# Patient Record
Sex: FEMALE | Employment: FULL TIME | URBAN - METROPOLITAN AREA
[De-identification: names, ages, dates, MRNs, and addresses within clinical notes are randomized per-mention and may not be internally consistent; named-entity substitution may affect disease eponyms.]

---

## 2022-09-08 ENCOUNTER — OFFICE VISIT (OUTPATIENT)
Dept: ORTHOPEDIC SURGERY | Age: 62
End: 2022-09-08
Payer: COMMERCIAL

## 2022-09-08 VITALS — HEIGHT: 69 IN | BODY MASS INDEX: 32.58 KG/M2 | WEIGHT: 220 LBS

## 2022-09-08 DIAGNOSIS — M17.12 PRIMARY OSTEOARTHRITIS OF LEFT KNEE: Primary | ICD-10-CM

## 2022-09-08 PROCEDURE — 99203 OFFICE O/P NEW LOW 30 MIN: CPT | Performed by: ORTHOPAEDIC SURGERY

## 2022-09-08 NOTE — PROGRESS NOTES
12 [facility]  History and Physical      Date:  2022    Name:  Silvia Jarvis  Address:  [address] [place] [address]    :  1960      Age:   57 y.o. Chief Complaint    Knee Pain (Left knee pain)      History of Present Illness:  Silvia Jarvis is a 57 y.o. female who presents for left knee pain. Patient states she has had left knee pain for well over 1 year. She recently had a arthroscopic meniscal root repair 2021. She has had continued pain since that time with minimal relief. She received a cortisone injection approximately 6 months ago with minimal relief and subsequently a gel injection 3 months ago again with minimal relief. She has a walking tolerance of approximately 5 minutes. She rates her pain at a 7 out of 10 which is sharp and aching in nature. She presents today for total knee replacement consultation. The patient denies any new injury. The patient denies any catching, giving way, joint locking, numbness, paresthesias, or weakness. Pain Assessment  Location of Pain: Knee  Location Modifiers: Left  Severity of Pain: 7  Duration of Pain: A few minutes  Frequency of Pain: Intermittent  Aggravating Factors: Stairs, Walking, Standing  Limiting Behavior: Some  Result of Injury: No  Work-Related Injury: No  Are there other pain locations you wish to document?: No    No past medical history on file. No past surgical history on file. No family history on file. Social History     Socioeconomic History    Marital status: Unknown       No current outpatient medications on file. No current facility-administered medications for this visit. No Known Allergies      Review of Systems:  A 14 point review of systems was completed by the patient and is available in the media section of the scanned medical record and was reviewed.         Vital Signs:   Ht 5' 9\" (1.753 m)   Wt 220 lb (99.8 kg)   LMP  (LMP Unknown) BMI 32.49 kg/m²     General Exam:    General: Cristi Brooke is a healthy and well appearing 57y.o. year old female who is sitting comfortably in our office in no acute distress. Neuro: Alert & Oriented x 3,  normal,  no focal deficits noted. Normal mood & affect. Eyes: Sclera clear  Ears: Normal external ear  Mouth: Patient wearing a mask  Pulm: Respirations unlabored and regular   Skin: Warm, well perfused      Knee Examination: Left knee    Inspection:  No effusion, ecchymosis, discoloration, erythema, excessive warmth. no gross deformities, no signs of infection. Palpation: There is moderate patellofemoral crepitus, there is lateral joint line tenderness. No other osseous or soft tissue tenderness around the knee. Negative calf tenderness    Range of Motion:  0-130 degrees without pain or difficulty. Slightly limited patellar mobility. Strength:  5/5 quadriceps, 5/5 hamstrings    Special Tests:  No ligament instability, valgus and varus stress test are stable at 0 and 30°. Lachman's exhibits a solid endpoint. Negative posterior drawer. Negative Homans sign    Gait: Non antalgic, without the use of assistive devices    Alignment: Slight varus    Neuro: Sensation equal bilateral lower extremities    Vascular: 2+ posterior tibialis pulse        Radiology:   Previously obtain imaging reviewed. No new images obtained. Office Procedures:  No orders of the defined types were placed in this encounter. Assessment: Patient is a 57 y.o. female left knee osteoarthritis with varus alignment    Encounter Diagnosis   Name Primary? Primary osteoarthritis of left knee Yes       No orders of the defined types were placed in this encounter. Medical decision making:  Patient's workup and evaluation were reviewed with the patient in the office today. Imaging was reviewed with the patient. Patient's diagnosis of moderate to severe left knee osteoarthritis was discussed at length.

## 2022-09-08 NOTE — LETTER
[address]  [address] [facility] [address]  Phone: [phone]  Fax: [phone]    Atul Nye MD        September 8, 2022     Patient: Tmamy Berry   YOB: 1960   Date of Visit: 9/8/2022       To Whom It May Concern: It is my medical opinion that Melissa Doe may return to work on 09/09/22 working 40 hours per week, 8 hour days with 2 days rest per week. Surgery scheduled for 10/10/22. If you have any questions or concerns, please don't hesitate to call.     Sincerely,      Board Certified Orthopaedic Surgeon  President and [address] [employer]    Professor Andre of [address] [place] [address]

## 2022-09-12 ENCOUNTER — TELEPHONE (OUTPATIENT)
Dept: ORTHOPEDIC SURGERY | Age: 62
End: 2022-09-12

## 2022-09-12 NOTE — TELEPHONE ENCOUNTER
Called and LVM that MRI had been approved and order was faxed to [facility]. She can call at her earliest convenience and schedule.

## 2022-09-13 ENCOUNTER — TELEPHONE (OUTPATIENT)
Dept: ORTHOPEDIC SURGERY | Age: 62
End: 2022-09-13

## 2022-09-13 NOTE — TELEPHONE ENCOUNTER
General Question     Subject: [facility]  Patient and /or Facility Request: Octavio Sepulveda  Contact Number: [phone]    PATIENT CALLED IN TO LET THE OFFICE KNOW THAT [facility] DON'T HAVE TO REF FOR HER TEST. Rose Marie Raymondsridhar PLEASE CALL PATIENT BACK AT THE ABOVE NUMBER. ..

## 2022-09-20 ENCOUNTER — TELEPHONE (OUTPATIENT)
Dept: ORTHOPEDIC SURGERY | Age: 62
End: 2022-09-20

## 2022-09-20 ENCOUNTER — HOSPITAL ENCOUNTER (OUTPATIENT)
Dept: PHYSICAL THERAPY | Age: 62
Setting detail: THERAPIES SERIES
Discharge: HOME | End: 2022-09-20
Payer: COMMERCIAL

## 2022-09-20 LAB
A/G RATIO: 1.5 (ref 1.5–2.5)
ABSOLUTE BASO #: 100 /CMM (ref 0–200)
ABSOLUTE EOS #: 600 /CMM (ref 0–500)
ABSOLUTE LYMPH #: 1300 /CMM (ref 1000–4800)
ABSOLUTE MONO #: 400 /CMM (ref 0–800)
ABSOLUTE NEUT #: 3500 /CMM (ref 1800–7700)
ALBUMIN SERPL-MCNC: 4.5 G/DL (ref 3.5–5)
ALP BLD-CCNC: 112 IU/L (ref 39–118)
ALT SERPL-CCNC: 12 IU/L (ref 10–40)
ANION GAP SERPL CALCULATED.3IONS-SCNC: 5 MMOL/L (ref 4–12)
APPEARANCE: CLEAR
APTT: 28.1 SEC (ref 23–38)
AST SERPL-CCNC: 16 IU/L (ref 15–41)
BASOPHILS RELATIVE PERCENT: 0.9 % (ref 0–2)
BILIRUB SERPL-MCNC: 0.4 MG/DL (ref 0.2–1)
BILIRUBIN URINE: NEGATIVE
BUN BLDV-MCNC: 13 MG/DL (ref 7–20)
C-REACTIVE PROTEIN: 1.3 MG/DL
CALCIUM SERPL-MCNC: 9 MG/DL (ref 8.8–10.5)
CHLORIDE BLD-SCNC: 104 MEQ/L (ref 101–111)
CO2: 31 MEQ/L (ref 21–32)
COLOR: YELLOW
CREAT SERPL-MCNC: 0.66 MG/DL (ref 0.6–1.3)
CREATININE CLEARANCE: >60
EOSINOPHILS RELATIVE PERCENT: 10.2 % (ref 0–6)
ESTIMATED AVERAGE GLUCOSE: 114 MG/DL
GLUCOSE URINE: NEGATIVE
GLUCOSE: 87 MG/DL (ref 70–110)
HBA1C MFR BLD: 5.6 % (ref 4.4–6.4)
HCT VFR BLD CALC: 41.3 % (ref 35–44)
HEMOGLOBIN: 13.6 GM/DL (ref 12–15)
INR BLD: 1.01 (ref 0.9–1.2)
KETONES, URINE: NEGATIVE
LEUKOCYTES, UA: NEGATIVE
LYMPHOCYTES RELATIVE PERCENT: 22.6 % (ref 15–45)
MCH RBC QN AUTO: 28.9 PG (ref 27.5–33)
MCHC RBC AUTO-ENTMCNC: 32.9 GM/DL (ref 33–36)
MCV RBC AUTO: 87.9 CU MIC (ref 80–97)
MONOCYTES RELATIVE PERCENT: 7.4 % (ref 2–10)
MRSA SCREEN: NOT DETECTED
MUCUS: PRESENT
NEUTROPHILS RELATIVE PERCENT: 58.9 % (ref 40–70)
NITRITE, URINE: NEGATIVE
NUCLEATED RBCS: 0 /100 WBC
PDW BLD-RTO: 13.5 % (ref 12–16)
PH, URINE: 7 (ref 5–8)
PLATELET # BLD: 313 TH/CMM (ref 150–400)
POTASSIUM SERPL-SCNC: 4 MEQ/L (ref 3.6–5)
PREALBUMIN: 24 MG/DL (ref 16–38)
PROTEIN, URINE: NEGATIVE
PROTHROMBIN TIME: 11.6 SEC (ref 9.6–13.3)
RBC # BLD: 4.69 MIL/CMM (ref 4–5.1)
RBC URINE: ABNORMAL /HPF
SEDIMENTATION RATE, ERYTHROCYTE: 39 MM/HR
SODIUM BLD-SCNC: 140 MEQ/L (ref 135–145)
SPECIFIC GRAVITY UA: 1.01 (ref 1–1.03)
SQUAMOUS EPITHELIAL: ABNORMAL /HPF
TOTAL PROTEIN: 7.5 G/DL (ref 6.2–8)
URINALYSIS REFLEX: NO
URINE HGB: ABNORMAL
UROBILINOGEN, URINE: NORMAL (ref 0.2–1)
VITAMIN D 25-HYDROXY: 14.2 NG/ML (ref 30–100)
WBC # BLD: 5.9 TH/CMM (ref 4.4–10.5)
WBC URINE: ABNORMAL /HPF

## 2022-09-20 PROCEDURE — 97110 THERAPEUTIC EXERCISES: CPT | Performed by: PHYSICAL THERAPIST

## 2022-09-20 PROCEDURE — 97161 PT EVAL LOW COMPLEX 20 MIN: CPT | Performed by: PHYSICAL THERAPIST

## 2022-09-20 NOTE — TELEPHONE ENCOUNTER
Patient is requesting to be taken off work. She originally asked for a letter with restrictions but has realized she is unable to work at all and would like that letter updated to be off work.     Bala Guaman:  [phone]

## 2022-09-20 NOTE — TELEPHONE ENCOUNTER
General Question     Subject: HAS INSURANCE BEEN CONTACTED FOR UPCOMING SX  Patient and /or Facility Request: Hartwell Mcardle  Contact Number: [phone]     Pt ASKING TO MAKE SURE THAT HER SX IS SET FOR 10/12/22 AND THAT THE INSURANCE HAS AUTHORIZED? PLEASE CALL Pt TO VERIFY THAT THERE IS NOTHING SHE NEEDS TO DO, AT THE # ABOVE.

## 2022-09-20 NOTE — FLOWSHEET NOTE
[facility] and [address],  Sports Performance and Rehabilitation, [facility] [address]  [address] [facility],[address] [facility]   [place]  Phone: [phone]  Fax: [phone]      Physical Therapy Daily Treatment Note  Date:  2022    Patient Name:  Yahaira Silverio    :  1960  MRN: [id number]  Restrictions/Precautions:    Medical/Treatment Diagnosis Information:  Diagnosis: M17.12 (ICD-10-CM) - Primary osteoarthritis of left knee  Treatment Diagnosis: M25.562 L Knee Pain, M25.662 L Knee Stiffness, R53.1 Weakness, R26.2 Difficulty with walking  Prehab & Pre-op for L TKR  DOS: 10/12/22  Meds:  Insurance/Certification information:  PT Insurance Information: [place] - out of network; paying OOP  Physician Information:   Josselin Oden MD  Has the plan of care been signed (Y/N):        []  Yes  [x]  No     Date of Patient follow up with Physician: 10/11/22 pre-op      Is this a Progress Report:     []  Yes  [x]  No        If Yes:  Date Range for reporting period:  Beginnin22  Ending:     Progress report will be due (10 Rx or 30 days whichever is less):        Recertification will be due (POC Duration  / 90 days whichever is less): 22         Visit # Insurance Allowable Auth Required   1 Out of network []  Yes []  No        OUTCOME MEASURE DATE SCORE   FOTO 10/13/22 NV               Latex Allergy:  [x]NO      []YES  Preferred Language for Healthcare:   [x]English       []other:    Pain level:  2-910     SUBJECTIVE:  See eval    OBJECTIVE:   Flexibility L R Comment   Hamstring -20 -10 90/90   Gastroc To Neutral To Neutral     ITB WNL WNL     Quad Mod tightness WNL                            ROM PROM AROM Overpressure Comment     L R L R L R     Flexion 115 138             Extension -5 cold 0 -3 QS                                                     Strength L R Comment   Quad 4 5     Hamstring 4 5     Gastroc NT NT     Hip  flexion 4- 4     Hip abd 4- 4                               Special Test Results/Comment   Homans NV   Temp NV      NV  Girth L R   Mid Patella       Suprapatellar       5cm above       15cm above          TEST INITIAL pre-op 9/20   GOAL   SINGLE LEG STANCE TIME L: 2 SEC     R: 16 SEC   >25 SECONDS   STAIR CLIMB TEST 16 SEC       TIMED UP And GO 10 SEC   61-75 y/o: <9sec  66-78 y/o: <10sec  80-81 y/o: <12 sec         Reflexes/Sensation:               [x]Dermatomes/Myotomes intact               []Reflexes equal and normal bilaterally              []Other:     Joint mobility:                [x]Normal                      []Hypo              []Hyper     Palpation: joint lines     Functional Mobility/Transfers: mod ind     Posture: flexed knee, mild varus     Bandages/Dressings/Incisions: scope incisions - well healed     Gait: (include devices/WB status) mildly antalgic, wide DASHAWN, lateral lean, no AD       RESTRICTIONS/PRECAUTIONS: L TKR    Exercises/Interventions:   Exercise/Equipment Resistance/Repetitions Other comments   Stretching     Hamstring 3 x 30\"    Towel Pull 3 x 30\"    Inclined Calf     Hip Flexion     ITB     Groin     Quad 3 x 30\" Mod papito                  SLR     Supine 3 x 5    Abduction 3 x 5    Adduction     Prone     SLR+          Isometrics     Quad sets 10 x 10\"    Ball squeeze 10 x 10\"         Patellar Glides     Medial     Superior     Inferior          ROM     Sheet Pulls PO    Hang Weights PO    Passive     Active     Weight Shift     Ankle Pumps PO                   CKC     Calf raises     Wall sits     Step ups     1 leg stand     Squatting     CC TKE     Balance     bridges          PRE     Extension  RANGE:   Flexion  RANGE:        Quantum machines     Leg press      Leg extension     Leg curl          Manual interventions                 HEP instruction & Full Pre-op instructions provided:   Access Code: [email or web address]  URL: [email or web address]  Date: 09/20/2022  Prepared by: Jeffry Howard Exercises  *Pre-op*  Seated Table Hamstring Stretch - 1 x daily - 7 x weekly - 3-5 reps - 30 sec hold  Long Sitting Calf Stretch with Strap - 1 x daily - 7 x weekly - 3 reps - 30 sec hold  Modified Tunde Stretch (Mirrored) - 1 x daily - 7 x weekly - 3-5 reps - 30 sec hold  Long Sitting Quad Set - 1 x daily - 7 x weekly - 1 sets - 10 reps - 10 hold  Long Sitting Ball Squeeze - 1 x daily - 7 x weekly - 10 reps - 10 sec hold  Supine Straight Leg Raises - 1 x daily - 7 x weekly - 3 sets - 10 reps  Sidelying Hip Abduction - 1 x daily - 7 x weekly - 3 sets - 10 reps  *Post-op*  Supine Ankle Pumps - 12 x daily - 7 x weekly - 10 reps - 3 sets  Long Sitting Quad Set - 12 x daily - 7 x weekly - 10 reps - 1 sets - 10 hold  Supine Knee Extension Stretch on Towel Roll - 3-6 x daily - 7 x weekly - 1 reps - 10 min hold  Seated Active Assistive Knee Flexion and Extension - 3-6 x daily - 7 x weekly - 10 reps - 1 sets - 10 sec hold       Therapeutic Exercise and NMR EXR  [x] (63433) Provided verbal/tactile cueing for activities related to strengthening, flexibility, endurance, ROM for improvements in LE, proximal hip, and core control with self care, mobility, lifting, ambulation.  [] (56165) Provided verbal/tactile cueing for activities related to improving balance, coordination, kinesthetic sense, posture, motor skill, proprioception  to assist with LE, proximal hip, and core control in self care, mobility, lifting, ambulation and eccentric single leg control.      NMR and Therapeutic Activities:    [x] (32842 or 14849) Provided verbal/tactile cueing for activities related to improving balance, coordination, kinesthetic sense, posture, motor skill, proprioception and motor activation to allow for proper function of core, proximal hip and LE with self care and ADLs  [x] (04637) Gait Re-education- Provided training and instruction to the patient for proper LE, core and proximal hip recruitment and positioning and eccentric body weight control with ambulation re-education including up and down stairs     Home Exercise Program:    [x] (32966) Reviewed/Progressed HEP activities related to strengthening, flexibility, endurance, ROM of core, proximal hip and LE for functional self-care, mobility, lifting and ambulation/stair navigation   [] (63311)Reviewed/Progressed HEP activities related to improving balance, coordination, kinesthetic sense, posture, motor skill, proprioception of core, proximal hip and LE for self care, mobility, lifting, and ambulation/stair navigation      Manual Treatments:  PROM / STM / Oscillations-Mobs:  G-I, II, III, IV (PA's, Inf., Post.)  [x] (71422) Provided manual therapy to mobilize LE, proximal hip and/or LS spine soft tissue/joints for the purpose of modulating pain, promoting relaxation,  increasing ROM, reducing/eliminating soft tissue swelling/inflammation/restriction, improving soft tissue extensibility and allowing for proper ROM for normal function with self care, mobility, lifting and ambulation. Modalities:      Charges:  Timed Code Treatment Minutes: 20'   Total Treatment Minutes: 61'       [x] EVAL (LOW) 08986 (typically 20 minutes face-to-face)  [] EVAL (MOD) 00902 (typically 30 minutes face-to-face)  [] EVAL (HIGH) 84777 (typically 45 minutes face-to-face)  [] RE-EVAL   [x] XS(76989) x 1    [] IONTO  [] NMR (70744) x     [] VASO  [] Manual (56521) x      [] Other:  [] TA x      [] Mech Traction (38456)  [] ES(attended) (23301)      [] ES (un) (48068):     GOALS:   Patient stated goal: Return to PLOF without restrictions    [] Progressing: [] Met: [] Not Met: [] Adjusted     Therapist goals for Patient:   Short Term Goals: To be achieved in: 2 weeks  1. Independent in HEP and progression per patient tolerance, in order to prevent re-injury. [] Progressing: [] Met: [] Not Met: [] Adjusted   2.  Patient will have a decrease in pain to facilitate improvement in movement, function, and ADLs as indicated by Functional Deficits. [] Progressing: [] Met: [] Not Met: [] Adjusted     Long Term Goals: To be achieved in: 12 weeks PO  1. Disability index score of 50+ on FOTO to assist with reaching prior level of function. [] Progressing: [] Met: [] Not Met: [] Adjusted  2. Patient will demonstrate increased AROM to 0-130 deg to allow for proper joint functioning as indicated by patients Functional Deficits. [] Progressing: [] Met: [] Not Met: [] Adjusted  3. Patient will demonstrate an increase in Strength to good proximal hip strength and control, within 5lb HHD in LE to allow for proper functional mobility as indicated by patients Functional Deficits. [] Progressing: [] Met: [] Not Met: [] Adjusted  4. Patient will return to household functional activities without increased symptoms or restriction. [] Progressing: [] Met: [] Not Met: [] Adjusted  5. Pt will be able to ascend a flight of stairs reciprocally without restrictions. (patient specific functional goal)    [] Progressing: [] Met: [] Not Met: [] Adjusted          Overall Progression Towards Functional goals/ Treatment Progress Update:  [] Patient is progressing as expected towards functional goals listed. [] Progression is slowed due to complexities/Impairments listed. [] Progression has been slowed due to co-morbidities.   [x] Plan just implemented, too soon to assess goals progression <30days   [] Goals require adjustment due to lack of progress  [] Patient is not progressing as expected and requires additional follow up with physician  [] Other    Prognosis for POC: [x] Good [] Fair  [] Poor      Patient requires continued skilled intervention: [x] Yes  [] No    Treatment/Activity Tolerance:  [x] Patient able to complete treatment  [] Patient limited by fatigue  [] Patient limited by pain     [] Patient limited by other medical complications  [] Other:         PLAN: See eval  [] Continue per plan of care [] Alter current plan (see

## 2022-09-20 NOTE — PLAN OF CARE
[facility] and [address],  [facility], [facility] [address]  [address] [facility],5[address]   [address], [address]  Phone: [phone]  Fax: [phone]       Physical Therapy Certification    Dear  Stephani Trejo,    We had the pleasure of evaluating the following patient for physical therapy services at [address] [place] [address]. A summary of our findings can be found in the initial assessment below. This includes our plan of care. If you have any questions or concerns regarding these findings, please do not hesitate to contact me at the office phone number checked above. Thank you for the referral.       Physician Signature:_______________________________Date:__________________  By signing above (or electronic signature), therapists plan is approved by physician      Patient: Con Richter   : 1960   MRN: [id number]  Referring Physician:  Stephani Trejo MD      Evaluation Date: 2022      Medical Diagnosis Information:  Diagnosis: M17.12 (ICD-10-CM) - Primary osteoarthritis of left knee   Treatment Diagnosis: M25.562 L Knee Pain, M25.662 L Knee Stiffness, R53.1 Weakness, R26.2 Difficulty with walking                                         Insurance information: PT Insurance Information: [place] - out of network; paying OOP     Precautions/ Contra-indications: none    C-SSRS Triggered by Intake questionnaire (Past 2 wk assessment):   [x] No, Questionnaire did not trigger screening.   [] Yes, Patient intake triggered further evaluation      [] C-SSRS Screening completed  [] PCP notified via Plan of Care  [] Emergency services notified     Latex Allergy:  [x]NO      []YES  Preferred Language for Healthcare:   [x]English       []other:    SUBJECTIVE: Patient stated complaint: Pt has had 1 year+ of L knee pain. Had a fall, landing on the fronts of both knees, that seemed to spark pain to begin.  Pt has had scope, cortisone, gel injection. Had PT after scope. None gave her lasting relief. C/O: pain, popping (pain-free), buckling (occasionally, both directions), stiffness, swelling. Pt here for prehab visit prior to TKR on 10/12/22. Lives near [address] [place] [address]. Relevant Medical History: L knee scope  Functional Disability Index: FOTO - complete post-operative    Pain Scale: 3-9/10 achy, throbbing; occasional sharp/sudden pain  Easing factors: rest, elevation, Tylenol at night  Provocative factors:  walking, stairs (ascending), inclines    Type: [x]Constant   []Intermittent  []Radiating [x]Localized []other:     Numbness/Tingling: none    Occupation/School: [employer] - partial restrictions currently. 8 hour days, 5 days per week. 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 Living Status/Prior Level of Function: Independent with ADLs and IADLs, household activities, work activities.      OBJECTIVE:      Flexibility L R Comment   Hamstring -20 -10 90/90   Gastroc To Neutral To Neutral    ITB WNL WNL    Quad Mod tightness WNL            ROM PROM AROM Overpressure Comment    L R L R L R    Flexion 115 138        Extension -5 cold 0 -3 QS                               Strength L R Comment   Quad 4 5    Hamstring 4 5    Gastroc NT NT    Hip  flexion 4- 4    Hip abd 4- 4                    Special Test Results/Comment   Homans NV   Temp NV     NV  Girth L R   Mid Patella     Suprapatellar     5cm above     15cm above       TEST INITIAL pre-op 9/20  GOAL   SINGLE LEG STANCE TIME L: 2 SEC    R: 16 SEC  >25 SECONDS   STAIR CLIMB TEST 16 SEC     TIMED UP And GO 10 SEC  61-77 y/o: <9sec  66-76 y/o: <10sec  80-79 y/o: <12 sec       Reflexes/Sensation:    [x]Dermatomes/Myotomes intact    []Reflexes equal and normal bilaterally   []Other:    Joint mobility:     [x]Normal    []Hypo   []Hyper    Palpation: joint lines    Functional Mobility/Transfers: mod ind    Posture: flexed knee, mild varus    Bandages/Dressings/Incisions: scope incisions - well healed    Gait: (include devices/WB status) mildly antalgic, wide DASHAWN, lateral lean, no AD    Orthopedic Special Tests: deferred d/t upcoming surgery                       [x] Patient history, allergies, meds reviewed. Medical chart reviewed. See intake form. Review Of Systems (ROS):  [x]Performed Review of systems (Integumentary, CardioPulmonary, Neurological) by intake and observation. Intake form has been scanned into medical record. Patient has been instructed to contact their primary care physician regarding ROS issues if not already being addressed at this time. Co-morbidities/Complexities (which will affect course of rehabilitation):   []None           Arthritic conditions   []Rheumatoid arthritis (M05.9)  [x]Osteoarthritis (M19.91)   Cardiovascular conditions   []Hypertension (I10)  []Hyperlipidemia (E78.5)  []Angina pectoris (I20)  []Atherosclerosis (I70)   Musculoskeletal conditions   []Disc pathology   []Congenital spine pathologies   [x]Prior surgical intervention  []Osteoporosis (M81.8)  []Osteopenia (M85.8)   Endocrine conditions   []Hypothyroid (E03.9)  []Hyperthyroid Gastrointestinal conditions   []Constipation (J21.43)   Metabolic conditions   []Morbid obesity (E66.01)  []Diabetes type 1(E10.65) or 2 (E11.65)   []Neuropathy (G60.9)     Pulmonary conditions   []Asthma (J45)  []Coughing   []COPD (J44.9)   Psychological Disorders  []Anxiety (F41.9)  []Depression (F32.9)   []Other:   []Other:          Barriers to/and or personal factors that will affect rehab potential:              []Age  []Sex              []Motivation/Lack of Motivation                        []Co-Morbidities              []Cognitive Function, education/learning barriers              []Environmental, home barriers              []profession/work barriers  []past PT/medical experience  []other:  Justification:     Falls Risk Assessment (30 days):   [x] Falls Risk assessed and no intervention required.   [] Falls Risk tendinitis/tendinosis    []signs/symptoms consistent with pathology which may benefit from Dry needling      []other:      Prognosis/Rehab Potential:      []Excellent   [x]Good    []Fair   []Poor    Tolerance of evaluation/treatment:    []Excellent   [x]Good    []Fair   []Poor    Physical Therapy Evaluation Complexity Justification  [x] A history of present problem with:  [] no personal factors and/or comorbidities that impact the plan of care;  [x]1-2 personal factors and/or comorbidities that impact the plan of care  []3 personal factors and/or comorbidities that impact the plan of care  [x] An examination of body systems using standardized tests and measures addressing any of the following: body structures and functions (impairments), activity limitations, and/or participation restrictions;:  [] a total of 1-2 or more elements   [x] a total of 3 or more elements   [] a total of 4 or more elements   [x] A clinical presentation with:  [x] stable and/or uncomplicated characteristics   [] evolving clinical presentation with changing characteristics  [] unstable and unpredictable characteristics;   [x] Clinical decision making of [x] low, [] moderate, [] high complexity using standardized patient assessment instrument and/or measurable assessment of functional outcome. [x] EVAL (LOW) 14311 (typically 20 minutes face-to-face)  [] EVAL (MOD) 50990 (typically 30 minutes face-to-face)  [] EVAL (HIGH) 03046 (typically 45 minutes face-to-face)  [] RE-EVAL       PLAN  Frequency/Duration:  2 days per week for 12 Weeks: beginning Post-operatively  Interventions:  [x]  Therapeutic exercise including: strength training, ROM, for Lower extremity and core   [x]  NMR activation and proprioception for LE, Glutes and Core   [x]  Manual therapy as indicated for LE, Hip and spine to include: Dry Needling/IASTM, STM, PROM, Gr I-IV mobilizations, manipulation.    [x] Modalities as needed that may include: thermal agents, E-stim, Biofeedback, US, iontophoresis as indicated  [x] Patient education on joint protection, postural re-education, activity modification, progression of HEP. HEP instruction:   Access Code: [email or web address]  URL: [email or web address]  Date: 09/20/2022  Prepared by: Segundo Conley    Exercises  *Pre-op*  Seated Table Hamstring Stretch - 1 x daily - 7 x weekly - 3-5 reps - 30 sec hold  Long Sitting Calf Stretch with Strap - 1 x daily - 7 x weekly - 3 reps - 30 sec hold  Modified Tunde Stretch (Mirrored) - 1 x daily - 7 x weekly - 3-5 reps - 30 sec hold  Long Sitting Quad Set - 1 x daily - 7 x weekly - 1 sets - 10 reps - 10 hold  Long Sitting Ball Squeeze - 1 x daily - 7 x weekly - 10 reps - 10 sec hold  Supine Straight Leg Raises - 1 x daily - 7 x weekly - 3 sets - 10 reps  Sidelying Hip Abduction - 1 x daily - 7 x weekly - 3 sets - 10 reps  *Post-op*  Supine Ankle Pumps - 12 x daily - 7 x weekly - 10 reps - 3 sets  Long Sitting Quad Set - 12 x daily - 7 x weekly - 10 reps - 1 sets - 10 hold  Supine Knee Extension Stretch on Towel Roll - 3-6 x daily - 7 x weekly - 1 reps - 10 min hold  Seated Active Assistive Knee Flexion and Extension - 3-6 x daily - 7 x weekly - 10 reps - 1 sets - 10 sec hold    GOALS:   Patient stated goal: Return to PLOF without restrictions    [] Progressing: [] Met: [] Not Met: [] Adjusted    Therapist goals for Patient:   Short Term Goals: To be achieved in: 2 weeks  1. Independent in HEP and progression per patient tolerance, in order to prevent re-injury. [] Progressing: [] Met: [] Not Met: [] Adjusted   2. Patient will have a decrease in pain to facilitate improvement in movement, function, and ADLs as indicated by Functional Deficits. [] Progressing: [] Met: [] Not Met: [] Adjusted    Long Term Goals: To be achieved in: 12 weeks PO  1. Disability index score of 50+ on FOTO to assist with reaching prior level of function. [] Progressing: [] Met: [] Not Met: [] Adjusted  2.  Patient will demonstrate increased AROM to 0-130 deg to allow for proper joint functioning as indicated by patients Functional Deficits. [] Progressing: [] Met: [] Not Met: [] Adjusted  3. Patient will demonstrate an increase in Strength to good proximal hip strength and control, within 5lb HHD in LE to allow for proper functional mobility as indicated by patients Functional Deficits. [] Progressing: [] Met: [] Not Met: [] Adjusted  4. Patient will return to household functional activities without increased symptoms or restriction. [] Progressing: [] Met: [] Not Met: [] Adjusted  5. Pt will be able to ascend a flight of stairs reciprocally without restrictions. (patient specific functional goal)    [] Progressing: [] Met: [] Not Met: [] Adjusted       Electronically signed by:  Kassi Wiggins, PT, DPT    Note: If patient does not return for scheduled/ recommended follow up visits, this note will serve as a discharge from care along with most recent update on progress.

## 2022-09-21 LAB — ZINC: 92 MCG/DL (ref 60–106)

## 2022-09-22 ENCOUNTER — TELEPHONE (OUTPATIENT)
Dept: ORTHOPEDIC SURGERY | Age: 62
End: 2022-09-22

## 2022-09-22 LAB — URINE CULTURE, ROUTINE: NORMAL

## 2022-09-22 NOTE — TELEPHONE ENCOUNTER
Auth: NPR  Date: 10/12/22  Reference # None  Spoke with: None  Type of SX: Outpatient  Location: [facility]  CPT: 35027   DX: M17.12  SX area: LT knee  Insurance: [employer]

## 2022-09-30 ENCOUNTER — HOSPITAL ENCOUNTER (OUTPATIENT)
Dept: GENERAL RADIOLOGY | Age: 62
Discharge: HOME | End: 2022-09-30
Payer: COMMERCIAL

## 2022-09-30 DIAGNOSIS — M17.12 ARTHRITIS OF KNEE, LEFT: ICD-10-CM

## 2022-09-30 PROCEDURE — 77073 BONE LENGTH STUDIES: CPT

## 2022-10-11 ENCOUNTER — APPOINTMENT (OUTPATIENT)
Dept: PHYSICAL THERAPY | Age: 62
End: 2022-10-11
Payer: COMMERCIAL

## 2022-10-13 ENCOUNTER — APPOINTMENT (OUTPATIENT)
Dept: PHYSICAL THERAPY | Age: 62
End: 2022-10-13
Payer: COMMERCIAL

## 2022-10-28 ENCOUNTER — TELEPHONE (OUTPATIENT)
Dept: ORTHOPEDIC SURGERY | Age: 62
End: 2022-10-28

## 2022-11-02 ENCOUNTER — TELEPHONE (OUTPATIENT)
Dept: ORTHOPEDIC SURGERY | Age: 62
End: 2022-11-02

## 2022-11-02 NOTE — TELEPHONE ENCOUNTER
Orthopedic Nurse Navigator Summary    Patient Name:  Oswald Duckworth  Anticipated Date of Surgery:  11/09/22  Attended Pre-op Education Class:  Video sent to patient email  PCP: JENNIFER Bonilla  Date of PCP visit for H&P: 09/26/22  Is patient in a Pain Management program:  Review of Medical history reveals history of: COPD, GERD, Migraines, Hyperlipidemia, Mild mitral regurgitation    Critical Lab Values  - Hemoglobin (g/dL):  Date: 09/20/22 Value 13.6  - Hematocrit(%): Date: 09/20/22  Value 41.3  - HgbA1C:  Date: 09/20/22  Value 5.6  - Albumin:  Date: 09/20/22  Value 4.5  - BUN:  Date:  09/20/22 Value 13  - Creatinine:  Date:  09/20/22 Value 0.66    MRSA swab 09/20/22- negative    Coronary Artery Disease/HTN/CHF history: Yes  Cardiologist: Amena BRICE  Cardiac clearance necessary:  Yes  Date of cardiac clearance appt: 10/05/22  Final Cardiac recommendations: On any anticoagulation: No    Diabetes History:  No  Most recent HgbA1C: 5.6  Pulmonary:  COPD/Emphysema/Use of home oxygen: COPD- had pulmonary clearance 10/07/22  Alcohol use: Social    BMI greater than 40 at time of scheduling: Additional medical concerns: Patient lives in [place], [place]. She is coming into town 11/08/22 and will stay at a hotel until 11/10/22 with her .   Additional recommendations for above concerns:  Attended Pre-Hab program:    Anticipated Discharge Disposition:  Home with OPT  Who will be with patient at home following discharge:    Equipment patient already has:  walker  Bedroom on first or second floor:  second- 18 steps  Bathroom on first or second floor:  both  Weight bearing status:  wbat  Pre-op ambulatory status: painful ambulation  Number of entry steps:  3  Caregiver assistance:  full time    Rich Mortimer, RN  Date:  11/02/22 Ventricular Rate : 76   Atrial Rate : 76   P-R Interval : 212   QRS Duration : 78   Q-T Interval : 336   QTC Calculation(Bezet) : 378   P Axis : 69   R Axis : 43   T Axis : 55   Diagnosis : Sinus rhythm with 1st degree AV block~Otherwise normal ECG~When compared with ECG of 12-JAN-2016 14:02,~T wave inversion no longer evident in Inferior leads~Confirmed by Neftali Morrow MD, Simi Grullon ([id number]) on 3/7/2017 12:32:59 PM

## 2022-11-08 ENCOUNTER — OFFICE VISIT (OUTPATIENT)
Dept: ORTHOPEDIC SURGERY | Age: 62
End: 2022-11-08

## 2022-11-08 ENCOUNTER — ANESTHESIA EVENT (OUTPATIENT)
Dept: OPERATING ROOM | Age: 62
End: 2022-11-08
Payer: COMMERCIAL

## 2022-11-08 VITALS — HEIGHT: 69 IN | WEIGHT: 220 LBS | BODY MASS INDEX: 32.58 KG/M2

## 2022-11-08 DIAGNOSIS — M17.12 PRIMARY OSTEOARTHRITIS OF LEFT KNEE: ICD-10-CM

## 2022-11-08 DIAGNOSIS — G89.29 CHRONIC PAIN OF LEFT KNEE: ICD-10-CM

## 2022-11-08 DIAGNOSIS — Z01.818 PRE-OP EXAMINATION: Primary | ICD-10-CM

## 2022-11-08 DIAGNOSIS — M25.562 CHRONIC PAIN OF LEFT KNEE: ICD-10-CM

## 2022-11-08 PROCEDURE — PREOPEXAM PRE-OP EXAM: Performed by: ORTHOPAEDIC SURGERY

## 2022-11-08 PROCEDURE — MISC250 COMPRESSION STOCKING: Performed by: ORTHOPAEDIC SURGERY

## 2022-11-08 RX ORDER — SUMATRIPTAN 100 MG/1
TABLET, FILM COATED ORAL
COMMUNITY
Start: 2022-09-26

## 2022-11-08 RX ORDER — CELECOXIB 100 MG/1
CAPSULE ORAL
COMMUNITY
Start: 2022-10-29

## 2022-11-08 RX ORDER — OMEPRAZOLE 40 MG/1
CAPSULE, DELAYED RELEASE ORAL
COMMUNITY
Start: 2022-10-10

## 2022-11-08 RX ORDER — MONTELUKAST SODIUM 10 MG/1
TABLET ORAL
COMMUNITY
Start: 2022-10-10

## 2022-11-08 RX ORDER — BUDESONIDE AND FORMOTEROL FUMARATE DIHYDRATE 160; 4.5 UG/1; UG/1
AEROSOL RESPIRATORY (INHALATION)
COMMUNITY
Start: 2022-11-01

## 2022-11-08 RX ORDER — ALBUTEROL SULFATE 2.5 MG/3ML
2.5 SOLUTION RESPIRATORY (INHALATION) EVERY 6 HOURS PRN
COMMUNITY

## 2022-11-08 NOTE — ANESTHESIA PRE PROCEDURE
Department of Anesthesiology  Preprocedure Note       Name:  Dina Hicks   Age:  57 y.o.  :  1960                                          MRN:  [id number]         Date:  2022      Surgeon: Kelsey Crane):  Ming Avelar MD    Procedure: Procedure(s):  LEFT TOTAL KNEE REPLACEMENT    Medications prior to admission:   Prior to Admission medications    Medication Sig Start Date End Date Taking? Authorizing Provider   ALBUTEROL IN Inhale into the lungs as needed   Yes Historical Provider, MD   albuterol (PROVENTIL) (2.5 MG/3ML) 0.083% nebulizer solution Take 2.5 mg by nebulization every 6 hours as needed for Wheezing   Yes Historical Provider, MD   SYMBICORT 160-4.5 MCG/ACT AERO INHALE 2 PUFFS BY MOUTH TWICE DAILY 22   Historical Provider, MD   omeprazole (PRILOSEC) 40 MG delayed release capsule TAKE 1 CAPSULE BY MOUTH EVERY DAY 10/10/22   Historical Provider, MD   montelukast (SINGULAIR) 10 MG tablet TAKE 1 TABLET BY MOUTH EVERYDAY AT BEDTIME 10/10/22   Historical Provider, MD   celecoxib (CELEBREX) 100 MG capsule TAKE 1 CAPSULE BY MOUTH EVERY DAY WITH FOOD AT NIGHT 10/29/22   Historical Provider, MD   SUMAtriptan (IMITREX) 100 MG tablet TAKE 1 TABLET BY MOUTH TWICE DAILY AS NEEDED, AT LEAST 2 HOURS BETWEEN DOSES 22   Historical Provider, MD       Current medications:    No current facility-administered medications for this encounter.      Current Outpatient Medications   Medication Sig Dispense Refill    ALBUTEROL IN Inhale into the lungs as needed      albuterol (PROVENTIL) (2.5 MG/3ML) 0.083% nebulizer solution Take 2.5 mg by nebulization every 6 hours as needed for Wheezing      SYMBICORT 160-4.5 MCG/ACT AERO INHALE 2 PUFFS BY MOUTH TWICE DAILY      omeprazole (PRILOSEC) 40 MG delayed release capsule TAKE 1 CAPSULE BY MOUTH EVERY DAY      montelukast (SINGULAIR) 10 MG tablet TAKE 1 TABLET BY MOUTH EVERYDAY AT BEDTIME      celecoxib (CELEBREX) 100 MG capsule TAKE 1 CAPSULE BY MOUTH EVERY DAY WITH FOOD AT NIGHT      SUMAtriptan (IMITREX) 100 MG tablet TAKE 1 TABLET BY MOUTH TWICE DAILY AS NEEDED, AT LEAST 2 HOURS BETWEEN DOSES         Allergies:  No Known Allergies    Problem List:  There is no problem list on file for this patient. Past Medical History:        Diagnosis Date    COPD (chronic obstructive pulmonary disease) ([address]     Wears glasses        Past Surgical History:        Procedure Laterality Date     SECTION      X2    DILATION AND CURETTAGE OF UTERUS      X2    KNEE ARTHROSCOPY Left     WISDOM TOOTH EXTRACTION         Social History:    Social History     Tobacco Use    Smoking status: Never    Smokeless tobacco: Never   Substance Use Topics    Alcohol use: Not Currently                                Counseling given: Not Answered      Vital Signs (Current):   Vitals:    22 1442   Weight: 220 lb (99.8 kg)   Height: 5' 9\" (1.753 m)                                              BP Readings from Last 3 Encounters:   No data found for BP       NPO Status:                                                                                 BMI:   Wt Readings from Last 3 Encounters:   22 220 lb (99.8 kg)   22 220 lb (99.8 kg)     Body mass index is 32.49 kg/m².     CBC:   Lab Results   Component Value Date/Time    WBC 5.9 2022 09:47 AM    RBC 4.69 2022 09:47 AM    HGB 13.6 2022 09:47 AM    HCT 41.3 2022 09:47 AM    MCV 87.9 2022 09:47 AM    RDW 13.5 2022 09:47 AM     2022 09:47 AM       CMP:   Lab Results   Component Value Date/Time     2022 09:47 AM    K 4.0 2022 09:47 AM     2022 09:47 AM    CO2 31 2022 09:47 AM    BUN 13 2022 09:47 AM    CREATININE 0.66 2022 09:47 AM    AGRATIO 1.5 2022 09:47 AM    GLUCOSE 87 2022 09:47 AM    PROT 7.5 2022 09:47 AM    CALCIUM 9.00 2022 09:47 AM    BILITOT 0.4 2022 09:47 AM    ALKPHOS 112 2022 09:47 AM    AST 16 09/20/2022 09:47 AM    ALT 12 09/20/2022 09:47 AM       POC Tests: No results for input(s): POCGLU, POCNA, POCK, POCCL, POCBUN, POCHEMO, POCHCT in the last 72 hours. Coags:   Lab Results   Component Value Date/Time    PROTIME 11.6 09/20/2022 09:47 AM    INR 1.01 09/20/2022 09:47 AM    APTT 28.1 09/20/2022 09:47 AM       HCG (If Applicable): No results found for: PREGTESTUR, PREGSERUM, HCG, HCGQUANT     ABGs: No results found for: PHART, PO2ART, GAY5NND, ESG4SZJ, BEART, R9UXGZSB     Type & Screen (If Applicable):  No results found for: LABABO, LABRH    Drug/Infectious Status (If Applicable):  No results found for: HIV, HEPCAB    COVID-19 Screening (If Applicable): No results found for: COVID19        Anesthesia Evaluation    Airway: Mallampati: II  TM distance: >3 FB   Neck ROM: full  Mouth opening: > = 3 FB   Dental:          Pulmonary:   (+) COPD:                             Cardiovascular:            Rhythm: regular  Rate: normal                    Neuro/Psych:               GI/Hepatic/Renal:             Endo/Other:                     Abdominal:             Vascular: Other Findings:           Anesthesia Plan      general and regional     ASA 2       Induction: intravenous. Anesthetic plan and risks discussed with patient. Plan discussed with CRNA.                     Irving Abrams MD   11/8/2022

## 2022-11-08 NOTE — PROGRESS NOTES
12 [facility]  History and Physical      Date:  2022    Name:  Dina Hicks  Address:  [address] [place] [address]    :  1960      Age:   57 y.o.    SSN:  [id number]      Medical Record Number:  [id number]      Chief Complaint    Knee Pain (Pre surg discussion)      History of Present Illness:  Dina Hicks is a 57 y.o. female who presents for their pre-op examination. The patient is in good health. The patient has no history of blood clots, not diabetes, no known allergies to medications, tolerates pain medications and is not on any estrogen products. The patient recently had a physical from her PCP and was cleared for surgery and stated being in good health. Patient's PCP note for preoperative clearance was reviewed. All the patient's labs were reviewed. History from the patient's visit on 2022. Dina Hicks is a 57 y.o. female who presents for left knee pain. Patient states she has had left knee pain for well over 1 year. She recently had a arthroscopic meniscal root repair 2021. She has had continued pain since that time with minimal relief. She received a cortisone injection approximately 6 months ago with minimal relief and subsequently a gel injection 3 months ago again with minimal relief. She has a walking tolerance of approximately 5 minutes. She rates her pain at a 7 out of 10 which is sharp and aching in nature. She presents today for total knee replacement consultation. The patient denies any new injury. The patient denies any catching, giving way, joint locking, numbness, paresthesias, or weakness.            Past Medical History:   Diagnosis Date    COPD (chronic obstructive pulmonary disease) ([address]     Wears glasses         Past Surgical History:   Procedure Laterality Date     SECTION      X2    DILATION AND CURETTAGE OF UTERUS      X2    KNEE ARTHROSCOPY Left     WISDOM TOOTH EXTRACTION No family history on file. Social History     Socioeconomic History    Marital status: Unknown   Tobacco Use    Smoking status: Never    Smokeless tobacco: Never   Vaping Use    Vaping Use: Never used   Substance and Sexual Activity    Alcohol use: Not Currently    Drug use: Not Currently       Current Outpatient Medications   Medication Sig Dispense Refill    SYMBICORT 160-4.5 MCG/ACT AERO INHALE 2 PUFFS BY MOUTH TWICE DAILY      omeprazole (PRILOSEC) 40 MG delayed release capsule TAKE 1 CAPSULE BY MOUTH EVERY DAY      montelukast (SINGULAIR) 10 MG tablet TAKE 1 TABLET BY MOUTH EVERYDAY AT BEDTIME      celecoxib (CELEBREX) 100 MG capsule TAKE 1 CAPSULE BY MOUTH EVERY DAY WITH FOOD AT NIGHT      SUMAtriptan (IMITREX) 100 MG tablet TAKE 1 TABLET BY MOUTH TWICE DAILY AS NEEDED, AT LEAST 2 HOURS BETWEEN DOSES      ALBUTEROL IN Inhale into the lungs as needed      albuterol (PROVENTIL) (2.5 MG/3ML) 0.083% nebulizer solution Take 2.5 mg by nebulization every 6 hours as needed for Wheezing       No current facility-administered medications for this visit. No Known Allergies    Review of Systems:  A 14 point review of systems was completed by the patient and is available in the media section of the scanned medical record and was reviewed on 11/8/2022. The review is negative with the exception of those things mentioned in the HPI and Past Medical History       Vital Signs:   Ht 5' 9\" (1.753 m)   Wt 220 lb (99.8 kg)   LMP  (LMP Unknown)   BMI 32.49 kg/m²     General Exam:    Neuro: Alert & Oriented x 3,  normal,  no focal deficits noted. Normal mood & affect. Eyes: Sclera clear  Ears: Normal external ear  Mouth:  No perioral lesions  Pulm: Respirations unlabored and regular   Skin: Warm, well perfused      Knee Examination: Left knee     Inspection:  No effusion, ecchymosis, discoloration, erythema, excessive warmth. no gross deformities, no signs of infection.       Palpation: There is moderate the patient. Risk and benefits were discussed. Patient was given literature on postoperative protocol. The patient is scheduled for TKR and a discussion and comprehensive presentation of TKR RBA including providing extensive information and written material on the surgery, PO course and rehab and patient expectations and compliance. The Web site patient instructional series was reviewed for further info for the patient and family. The PO instruction sheet is provided and details on the DVT program and skin preparation pre op explained. The risks explained included but not limited to infection, pain, swelling, woumd healing, blood clots, bleeding, fibrosis, anesthesia, allergies meds, atrophy, and limitation in knee motion, implant loosening, need for additional surgery, alignment problems. Success rates reviewed including a normal knee cannot be established and there are limitations on activity that a TKR requires including 10% of patients in general having knee pain limits, weakness on chair on stair activity, and in particular that recreational activity may require major limits. The final result cannot be predicted and each patient responds to surgery differently. The patient verbally expressed an understanding and all questions answered. The patient has major arthritic damage to the knee joint with pain limiting daily activities and significant restrictions and limitations effecting the quality of life. All conservative measures have been completed and the patient wishes to undergo TKR. All the patient's questions were answered while in the clinic. The patient is understanding of all instructions and agrees with the plan. This patient exhibits high complexity for obtaining an independent history, reviewing past medical records, independent interpretation of diagnostic imaging, and further coordinating care.   The patient exhibits high risk given that the patient is undergoing an elective orthopedic procedure with identified risk factors. 11/08/22  5:29 PM        Bar Howell PA-C    Physician Assistant - Certified  [address] [place] [address]    11/08/22 5:29 PM    During this examination, I, Amberly Ivory, functioned as a scribe for Dr. Padmaja Tolentino. This dictation was performed with a verbal recognition program (DRAGON) and it was checked for errors. It is possible that there are still dictated errors within this office note. If so, please bring any errors to my attention for an addendum. All efforts were made to ensure that this office note is accurate. This dictation was performed with a verbal recognition program (DRAGON) and it was checked for errors. It is possible that there are still dictated errors within this office note. If so, please bring any errors to my attention for an addendum. All efforts were made to ensure that this office note is accurate. Supervising Physician Attestation:  I, Dr. Padmaja Tolentino, personally performed the services described in this documentation as above, and it is both accurate and complete and I agree with all pertinent clinical information. I personally interviewed the patient and performed a physical examination and medical decision making. I discussed the patient's condition and treatment options and have  reviewed and agree with the past medical, family and social history unless otherwise noted. All of the patient's questions were answered. I personally supervised the Orthopedic and Sportsmedicine Fellow when when noted in the evaluation and treatment plan of the patient.       Board Certified Orthopaedic Surgeon  [facility] and [address]  [facility] and [address] and [employer]  Professor of [facility] Kern

## 2022-11-08 NOTE — PROGRESS NOTES
[facility] PRE-SURGICAL TESTING INSTRUCTIONS                      PRIOR TO PROCEDURE DATE:    1. PLEASE FOLLOW ANY INSTRUCTIONS GIVEN TO YOU PER YOUR SURGEON. 2. Arrange for someone to drive you home and be with you for the first 24 hours after discharge for your safety after your procedure for which you received sedation. Ensure it is someone we can share information with regarding your discharge. NOTE: At this time ONLY 2 ADULTS may accompany you   One person MUST stay at hospital entire time if outpatient surgery      3. You must contact your surgeon for instructions IF:  You are taking any blood thinners, aspirin, anti-inflammatory or vitamins. There is a change in your physical condition such as a cold, fever, rash, cuts, sores, or any other infection, especially near your surgical site. 4. Do not drink alcohol the day before or day of your procedure. Do not use any recreational marijuana at least 24 hours or street drugs (heroin, cocaine) at minimum 5 days prior to your procedure. 5. A Pre-Surgical History and Physical MUST be completed WITHIN 30 DAYS OR LESS prior to your procedure. by your Physician or an Urgent Care        THE DAY OF YOUR PROCEDURE:  1. Follow instructions for ARRIVAL TIME as DIRECTED BY YOUR SURGEON. 2. Enter the MAIN entrance from [employer] and follow the signs to the free Parking [employer] or [employer] (offered free of charge 7 am-5pm). 3. Enter the Main Entrance of the hospital (do not enter from the lower level of the parking garage). Upon entrance, check in with the  at the surgical information desk on your LEFT. Bring your insurance card and photo ID to register      4. DO NOT EAT ANYTHING 8 hours prior to arrival for surgery. You may have up to 8 ounces of water 4 hours prior to your arrival for surgery.    NOTE: ALL Gastric, Bariatric & Bowel surgery patients - you MUST follow your surgeon's instructions regarding eating/ drinking as you will have very specific instructions to follow. If you did not receive these, call your surgeon's office immediately. 5. MEDICATIONS:  Take the following medications with a SMALL sip of water: PRILOSEC, BRING INHALER  Use your usual dose of inhalers the morning of surgery. BRING your rescue inhaler with you to hospital.   Anesthesia does NOT want you to take insulin the morning of surgery. They will control your blood sugar while you are at the hospital. Please contact your ordering physician for instructions regarding your insulin the night before your procedure. If you have an insulin pump, please keep it set on basal rate. Bariatric patient's call your surgeon if on diabetic medications as some may need to be stopped 1 week prior to surgery    6. Do not swallow additional water when brushing teeth. No gum, candy, mints, or ice chips. Refrain from smoking or at least decrease the amount on day of surgery. 7. Morning of surgery:   Take a shower with an antibacterial soap (i.e., Safeguard or Dial) OR your physician may have instructed you to use Hibiclens. Dress in loose, comfortable clothing appropriate for redressing after your procedure. Do not wear jewelry (including body piercings), make-up (especially NO eye make-up), fingernail polish (NO toenail polish if foot/leg surgery), lotion, powders, or metal hairclips. Do not shave or wax for 72 hours prior to procedure near your operative site. Shaving with a razor can irritate your skin and make it easier to develop an infection. On the day of your procedure, any hair that needs to be removed near the surgical site will be 'clipped' by a healthcare worker using a special clipper designed to avoid skin irritation. 8. Dentures, glasses, or contacts will need to be removed before your procedure. Bring cases for your glasses, contacts, dentures, or hearing aids to protect them while you are in surgery.       9. If you use a CPAP, please bring it with you on the day of your procedure. 10. We recommend that valuable personal belongings such as cash, cell phones, e-tablets, or jewelry, be left at home during your stay. The hospital will not be responsible for valuables that are not secured in the hospital safe. However, if your insurance requires a co-pay, you may want to bring a method of payment, i.e., Check or credit card, if you wish to pay your co-pay the day of surgery. 11. If you are to stay overnight, you may bring a bag with personal items. Please have any large items you may need brought in by your family after your arrival to your hospital room. 12. If you have a Living Will or Durable Power of , please bring a copy on the day of your procedure. How we keep you safe and work to prevent surgical site infections:   1. Health care workers should always check your ID bracelet to verify your name and birth date. You will be asked many times to state your name, date of birth, and allergies. 2. Health care workers should always clean their hands with soap or alcohol gel before providing care to you. It is okay to ask anyone if they cleaned their hands before they touch you. 3. You will be actively involved in verifying the type of procedure you are having and ensuring the correct surgical site. This will be confirmed multiple times prior to your procedure. Do NOT raisa your surgery site UNLESS instructed to by your surgeon. 4. When you are in the operating room, your surgical site will be cleansed with a special soap, and in most cases, you will be given an antibiotic before the surgery begins. What to expect AFTER your procedure? 1. Immediately following your procedure, your will be taken to the PACU for the first phase of your recovery. Your nurse will help you recover from any potential side effects of anesthesia, such as extreme drowsiness, changes in your vital signs or breathing patterns.  Nausea, headache, muscle aches, or sore throat may also occur after anesthesia. Your nurse will help you manage these potential side effects. 2. For comfort and safety, arrange to have someone at home with you for the first 24 hours after discharge. 3. You and your family will be given written instructions about your diet, activity, dressing care, medications, and return visits. 4. Once at home, should issues with nausea, pain, or bleeding occur, or should you notice any signs of infection, you should call your surgeon. 5. Always clean your hands before and after caring for your wound. Do not let your family touch your surgery site without cleaning their hands. 6. Narcotic pain medications can cause significant constipation. You may want to add a stool softener to your postoperative medication schedule or speak to your surgeon on how best to manage this SIDE EFFECT. SPECIAL INSTRUCTIONS BRING INHALER, PATIENT ACKNOWLEDGES UNDERSTANDING OF THIS ALONG WITH PRE OP INSTRUCTIONS. Thank you for allowing us to care for you. We strive to exceed your expectations in the delivery of care and service provided to you and your family. If you need to contact the [facility] staff for any reason, please call us at [phone]    Instructions reviewed with patient during preadmission testing phone interview.   Kate Jimenez RN.11/8/2022 .2:57 PM      ADDITIONAL EDUCATIONAL INFORMATION REVIEWED PER PHONE WITH YOU AND/OR YOUR FAMILY:  Yes Hibiclens® Bathing Instructions

## 2022-11-09 ENCOUNTER — ANESTHESIA (OUTPATIENT)
Dept: OPERATING ROOM | Age: 62
End: 2022-11-09
Payer: COMMERCIAL

## 2022-11-09 ENCOUNTER — HOSPITAL ENCOUNTER (OUTPATIENT)
Age: 62
Setting detail: OUTPATIENT SURGERY
Discharge: HOME | End: 2022-11-09
Attending: ORTHOPAEDIC SURGERY | Admitting: ORTHOPAEDIC SURGERY
Payer: COMMERCIAL

## 2022-11-09 VITALS
SYSTOLIC BLOOD PRESSURE: 127 MMHG | BODY MASS INDEX: 31.07 KG/M2 | OXYGEN SATURATION: 91 % | HEART RATE: 78 BPM | RESPIRATION RATE: 16 BRPM | HEIGHT: 69 IN | DIASTOLIC BLOOD PRESSURE: 54 MMHG | TEMPERATURE: 97.5 F | WEIGHT: 209.8 LBS

## 2022-11-09 DIAGNOSIS — Z96.652 STATUS POST LEFT KNEE REPLACEMENT: Primary | ICD-10-CM

## 2022-11-09 PROCEDURE — 97535 SELF CARE MNGMENT TRAINING: CPT

## 2022-11-09 PROCEDURE — 6360000002 HC RX W HCPCS: Performed by: ORTHOPAEDIC SURGERY

## 2022-11-09 PROCEDURE — 3600000015 HC SURGERY LEVEL 5 ADDTL 15MIN: Performed by: ORTHOPAEDIC SURGERY

## 2022-11-09 PROCEDURE — 7100000001 HC PACU RECOVERY - ADDTL 15 MIN: Performed by: ORTHOPAEDIC SURGERY

## 2022-11-09 PROCEDURE — 3700000001 HC ADD 15 MINUTES (ANESTHESIA): Performed by: ORTHOPAEDIC SURGERY

## 2022-11-09 PROCEDURE — 6360000002 HC RX W HCPCS: Performed by: FAMILY MEDICINE

## 2022-11-09 PROCEDURE — 2500000003 HC RX 250 WO HCPCS: Performed by: FAMILY MEDICINE

## 2022-11-09 PROCEDURE — 2580000003 HC RX 258: Performed by: ORTHOPAEDIC SURGERY

## 2022-11-09 PROCEDURE — 97530 THERAPEUTIC ACTIVITIES: CPT

## 2022-11-09 PROCEDURE — 64447 NJX AA&/STRD FEMORAL NRV IMG: CPT | Performed by: FAMILY MEDICINE

## 2022-11-09 PROCEDURE — 3700000000 HC ANESTHESIA ATTENDED CARE: Performed by: ORTHOPAEDIC SURGERY

## 2022-11-09 PROCEDURE — 7100000011 HC PHASE II RECOVERY - ADDTL 15 MIN: Performed by: ORTHOPAEDIC SURGERY

## 2022-11-09 PROCEDURE — 2709999900 HC NON-CHARGEABLE SUPPLY: Performed by: ORTHOPAEDIC SURGERY

## 2022-11-09 PROCEDURE — C1713 ANCHOR/SCREW BN/BN,TIS/BN: HCPCS | Performed by: ORTHOPAEDIC SURGERY

## 2022-11-09 PROCEDURE — A4217 STERILE WATER/SALINE, 500 ML: HCPCS | Performed by: ORTHOPAEDIC SURGERY

## 2022-11-09 PROCEDURE — 2720000010 HC SURG SUPPLY STERILE: Performed by: ORTHOPAEDIC SURGERY

## 2022-11-09 PROCEDURE — C9290 INJ, BUPIVACAINE LIPOSOME: HCPCS | Performed by: FAMILY MEDICINE

## 2022-11-09 PROCEDURE — 97116 GAIT TRAINING THERAPY: CPT

## 2022-11-09 PROCEDURE — 97165 OT EVAL LOW COMPLEX 30 MIN: CPT

## 2022-11-09 PROCEDURE — 7100000010 HC PHASE II RECOVERY - FIRST 15 MIN: Performed by: ORTHOPAEDIC SURGERY

## 2022-11-09 PROCEDURE — 93005 ELECTROCARDIOGRAM TRACING: CPT | Performed by: ORTHOPAEDIC SURGERY

## 2022-11-09 PROCEDURE — 97161 PT EVAL LOW COMPLEX 20 MIN: CPT

## 2022-11-09 PROCEDURE — 3600000005 HC SURGERY LEVEL 5 BASE: Performed by: ORTHOPAEDIC SURGERY

## 2022-11-09 PROCEDURE — 2500000003 HC RX 250 WO HCPCS: Performed by: ORTHOPAEDIC SURGERY

## 2022-11-09 PROCEDURE — 2580000003 HC RX 258: Performed by: ANESTHESIOLOGY

## 2022-11-09 PROCEDURE — 6370000000 HC RX 637 (ALT 250 FOR IP): Performed by: FAMILY MEDICINE

## 2022-11-09 PROCEDURE — 7100000000 HC PACU RECOVERY - FIRST 15 MIN: Performed by: ORTHOPAEDIC SURGERY

## 2022-11-09 PROCEDURE — C1776 JOINT DEVICE (IMPLANTABLE): HCPCS | Performed by: ORTHOPAEDIC SURGERY

## 2022-11-09 DEVICE — CEMENT BNE 20ML 40GM FULL DOSE PMMA W/O ANTIBIO M VISC: Type: IMPLANTABLE DEVICE | Site: KNEE | Status: FUNCTIONAL

## 2022-11-09 DEVICE — JOURNEY II BCS XLPE ARTICULAR                                    INSERT SIZE 5-6 LEFT 11MM
Type: IMPLANTABLE DEVICE | Site: KNEE | Status: FUNCTIONAL
Brand: JOURNEY

## 2022-11-09 DEVICE — KNEE K1 TOT HEMI STD CEM IMPL CAPPED K1 SN: Type: IMPLANTABLE DEVICE | Site: KNEE | Status: FUNCTIONAL

## 2022-11-09 DEVICE — JOURNEY 7.5 ROUND RESURF PAT 35MM STANDARD
Type: IMPLANTABLE DEVICE | Site: KNEE | Status: FUNCTIONAL
Brand: JOURNEY

## 2022-11-09 DEVICE — JOURNEY TIBIAL BASEPLATE NONPOROUS                                    LEFT SIZE 5
Type: IMPLANTABLE DEVICE | Site: KNEE | Status: FUNCTIONAL
Brand: JOURNEY

## 2022-11-09 DEVICE — JOURNEY II BCS FEMORAL OXINIUM                                    LEFT SIZE 6
Type: IMPLANTABLE DEVICE | Site: KNEE | Status: FUNCTIONAL
Brand: JOURNEY

## 2022-11-09 RX ORDER — METOCLOPRAMIDE HYDROCHLORIDE 5 MG/ML
10 INJECTION INTRAMUSCULAR; INTRAVENOUS ONCE
Status: COMPLETED | OUTPATIENT
Start: 2022-11-09 | End: 2022-11-09

## 2022-11-09 RX ORDER — SUCCINYLCHOLINE/SOD CL,ISO/PF 200MG/10ML
SYRINGE (ML) INTRAVENOUS PRN
Status: DISCONTINUED | OUTPATIENT
Start: 2022-11-09 | End: 2022-11-09 | Stop reason: SDUPTHER

## 2022-11-09 RX ORDER — LORAZEPAM 2 MG/ML
0.5 INJECTION INTRAMUSCULAR
Status: DISCONTINUED | OUTPATIENT
Start: 2022-11-09 | End: 2022-11-09 | Stop reason: HOSPADM

## 2022-11-09 RX ORDER — ASPIRIN 81 MG/1
81 TABLET ORAL 2 TIMES DAILY
Qty: 30 TABLET | Refills: 0 | Status: SHIPPED | OUTPATIENT
Start: 2022-11-09

## 2022-11-09 RX ORDER — VANCOMYCIN HYDROCHLORIDE 1 G/20ML
INJECTION, POWDER, LYOPHILIZED, FOR SOLUTION INTRAVENOUS PRN
Status: DISCONTINUED | OUTPATIENT
Start: 2022-11-09 | End: 2022-11-09 | Stop reason: HOSPADM

## 2022-11-09 RX ORDER — PROCHLORPERAZINE EDISYLATE 5 MG/ML
5 INJECTION INTRAMUSCULAR; INTRAVENOUS
Status: DISCONTINUED | OUTPATIENT
Start: 2022-11-09 | End: 2022-11-09 | Stop reason: HOSPADM

## 2022-11-09 RX ORDER — LABETALOL HYDROCHLORIDE 5 MG/ML
10 INJECTION, SOLUTION INTRAVENOUS
Status: DISCONTINUED | OUTPATIENT
Start: 2022-11-09 | End: 2022-11-09 | Stop reason: HOSPADM

## 2022-11-09 RX ORDER — DEXAMETHASONE SODIUM PHOSPHATE 4 MG/ML
INJECTION, SOLUTION INTRA-ARTICULAR; INTRALESIONAL; INTRAMUSCULAR; INTRAVENOUS; SOFT TISSUE PRN
Status: DISCONTINUED | OUTPATIENT
Start: 2022-11-09 | End: 2022-11-09 | Stop reason: SDUPTHER

## 2022-11-09 RX ORDER — TRANEXAMIC ACID 100 MG/ML
INJECTION, SOLUTION INTRAVENOUS PRN
Status: DISCONTINUED | OUTPATIENT
Start: 2022-11-09 | End: 2022-11-09 | Stop reason: HOSPADM

## 2022-11-09 RX ORDER — LIDOCAINE HYDROCHLORIDE 20 MG/ML
INJECTION, SOLUTION INTRAVENOUS PRN
Status: DISCONTINUED | OUTPATIENT
Start: 2022-11-09 | End: 2022-11-09 | Stop reason: SDUPTHER

## 2022-11-09 RX ORDER — OXYCODONE HYDROCHLORIDE AND ACETAMINOPHEN 5; 325 MG/1; MG/1
1 TABLET ORAL EVERY 6 HOURS PRN
Qty: 35 TABLET | Refills: 0 | Status: SHIPPED | OUTPATIENT
Start: 2022-11-09 | End: 2022-11-19

## 2022-11-09 RX ORDER — BUPIVACAINE HYDROCHLORIDE 5 MG/ML
INJECTION, SOLUTION EPIDURAL; INTRACAUDAL
Status: COMPLETED
Start: 2022-11-09 | End: 2022-11-09

## 2022-11-09 RX ORDER — SODIUM CHLORIDE 9 MG/ML
INJECTION, SOLUTION INTRAVENOUS CONTINUOUS
Status: DISCONTINUED | OUTPATIENT
Start: 2022-11-09 | End: 2022-11-09 | Stop reason: HOSPADM

## 2022-11-09 RX ORDER — BUPIVACAINE HYDROCHLORIDE 5 MG/ML
INJECTION, SOLUTION EPIDURAL; INTRACAUDAL
Status: COMPLETED | OUTPATIENT
Start: 2022-11-09 | End: 2022-11-09

## 2022-11-09 RX ORDER — EPHEDRINE SULFATE 50 MG/ML
INJECTION INTRAVENOUS PRN
Status: DISCONTINUED | OUTPATIENT
Start: 2022-11-09 | End: 2022-11-09 | Stop reason: SDUPTHER

## 2022-11-09 RX ORDER — SODIUM CHLORIDE 9 MG/ML
25 INJECTION, SOLUTION INTRAVENOUS PRN
Status: DISCONTINUED | OUTPATIENT
Start: 2022-11-09 | End: 2022-11-09 | Stop reason: HOSPADM

## 2022-11-09 RX ORDER — SODIUM CHLORIDE 0.9 % (FLUSH) 0.9 %
5-40 SYRINGE (ML) INJECTION PRN
Status: DISCONTINUED | OUTPATIENT
Start: 2022-11-09 | End: 2022-11-09 | Stop reason: HOSPADM

## 2022-11-09 RX ORDER — FENTANYL CITRATE 50 UG/ML
INJECTION, SOLUTION INTRAMUSCULAR; INTRAVENOUS PRN
Status: DISCONTINUED | OUTPATIENT
Start: 2022-11-09 | End: 2022-11-09 | Stop reason: SDUPTHER

## 2022-11-09 RX ORDER — FENTANYL CITRATE 50 UG/ML
25 INJECTION, SOLUTION INTRAMUSCULAR; INTRAVENOUS EVERY 5 MIN PRN
Status: DISCONTINUED | OUTPATIENT
Start: 2022-11-09 | End: 2022-11-09 | Stop reason: HOSPADM

## 2022-11-09 RX ORDER — ONDANSETRON 2 MG/ML
4 INJECTION INTRAMUSCULAR; INTRAVENOUS
Status: COMPLETED | OUTPATIENT
Start: 2022-11-09 | End: 2022-11-09

## 2022-11-09 RX ORDER — KETOROLAC TROMETHAMINE 15 MG/ML
INJECTION, SOLUTION INTRAMUSCULAR; INTRAVENOUS PRN
Status: DISCONTINUED | OUTPATIENT
Start: 2022-11-09 | End: 2022-11-09 | Stop reason: SDUPTHER

## 2022-11-09 RX ORDER — OXYCODONE HYDROCHLORIDE 5 MG/1
5 TABLET ORAL PRN
Status: COMPLETED | OUTPATIENT
Start: 2022-11-09 | End: 2022-11-09

## 2022-11-09 RX ORDER — ONDANSETRON 2 MG/ML
INJECTION INTRAMUSCULAR; INTRAVENOUS PRN
Status: DISCONTINUED | OUTPATIENT
Start: 2022-11-09 | End: 2022-11-09 | Stop reason: SDUPTHER

## 2022-11-09 RX ORDER — PHENYLEPHRINE HYDROCHLORIDE 10 MG/ML
INJECTION INTRAVENOUS PRN
Status: DISCONTINUED | OUTPATIENT
Start: 2022-11-09 | End: 2022-11-09 | Stop reason: SDUPTHER

## 2022-11-09 RX ORDER — ROCURONIUM BROMIDE 10 MG/ML
INJECTION, SOLUTION INTRAVENOUS PRN
Status: DISCONTINUED | OUTPATIENT
Start: 2022-11-09 | End: 2022-11-09 | Stop reason: SDUPTHER

## 2022-11-09 RX ORDER — DOCUSATE SODIUM 100 MG/1
100 CAPSULE, LIQUID FILLED ORAL 2 TIMES DAILY
Qty: 60 CAPSULE | Refills: 0 | Status: SHIPPED | OUTPATIENT
Start: 2022-11-09 | End: 2022-12-09

## 2022-11-09 RX ORDER — OXYCODONE HYDROCHLORIDE 5 MG/1
10 TABLET ORAL PRN
Status: COMPLETED | OUTPATIENT
Start: 2022-11-09 | End: 2022-11-09

## 2022-11-09 RX ORDER — SODIUM CHLORIDE, SODIUM LACTATE, POTASSIUM CHLORIDE, CALCIUM CHLORIDE 600; 310; 30; 20 MG/100ML; MG/100ML; MG/100ML; MG/100ML
INJECTION, SOLUTION INTRAVENOUS CONTINUOUS
Status: DISCONTINUED | OUTPATIENT
Start: 2022-11-09 | End: 2022-11-09 | Stop reason: HOSPADM

## 2022-11-09 RX ORDER — DIPHENHYDRAMINE HYDROCHLORIDE 50 MG/ML
12.5 INJECTION INTRAMUSCULAR; INTRAVENOUS
Status: DISCONTINUED | OUTPATIENT
Start: 2022-11-09 | End: 2022-11-09 | Stop reason: HOSPADM

## 2022-11-09 RX ORDER — SODIUM CHLORIDE 0.9 % (FLUSH) 0.9 %
5-40 SYRINGE (ML) INJECTION EVERY 12 HOURS SCHEDULED
Status: DISCONTINUED | OUTPATIENT
Start: 2022-11-09 | End: 2022-11-09 | Stop reason: HOSPADM

## 2022-11-09 RX ORDER — METOCLOPRAMIDE HYDROCHLORIDE 5 MG/ML
10 INJECTION INTRAMUSCULAR; INTRAVENOUS EVERY 6 HOURS
Status: DISCONTINUED | OUTPATIENT
Start: 2022-11-09 | End: 2022-11-09

## 2022-11-09 RX ORDER — MEPERIDINE HYDROCHLORIDE 25 MG/ML
12.5 INJECTION INTRAMUSCULAR; INTRAVENOUS; SUBCUTANEOUS EVERY 5 MIN PRN
Status: DISCONTINUED | OUTPATIENT
Start: 2022-11-09 | End: 2022-11-09 | Stop reason: HOSPADM

## 2022-11-09 RX ORDER — PROPOFOL 10 MG/ML
INJECTION, EMULSION INTRAVENOUS PRN
Status: DISCONTINUED | OUTPATIENT
Start: 2022-11-09 | End: 2022-11-09 | Stop reason: SDUPTHER

## 2022-11-09 RX ADMIN — PHENYLEPHRINE HYDROCHLORIDE 200 MCG: 10 INJECTION INTRAVENOUS at 10:17

## 2022-11-09 RX ADMIN — PROPOFOL 100 MG: 10 INJECTION, EMULSION INTRAVENOUS at 10:00

## 2022-11-09 RX ADMIN — EPHEDRINE SULFATE 10 MG: 50 INJECTION INTRAVENOUS at 10:15

## 2022-11-09 RX ADMIN — SODIUM CHLORIDE, POTASSIUM CHLORIDE, SODIUM LACTATE AND CALCIUM CHLORIDE: 600; 310; 30; 20 INJECTION, SOLUTION INTRAVENOUS at 08:13

## 2022-11-09 RX ADMIN — PROPOFOL 40 MG: 10 INJECTION, EMULSION INTRAVENOUS at 10:03

## 2022-11-09 RX ADMIN — BUPIVACAINE 10 ML: 13.3 INJECTION, SUSPENSION, LIPOSOMAL INFILTRATION at 08:50

## 2022-11-09 RX ADMIN — FENTANYL CITRATE 50 MCG: 50 INJECTION, SOLUTION INTRAMUSCULAR; INTRAVENOUS at 09:56

## 2022-11-09 RX ADMIN — Medication 120 MG: at 10:01

## 2022-11-09 RX ADMIN — OXYCODONE 5 MG: 5 TABLET ORAL at 14:08

## 2022-11-09 RX ADMIN — HYDROMORPHONE HYDROCHLORIDE 0.5 MG: 1 INJECTION, SOLUTION INTRAMUSCULAR; INTRAVENOUS; SUBCUTANEOUS at 12:51

## 2022-11-09 RX ADMIN — ONDANSETRON 4 MG: 2 INJECTION INTRAMUSCULAR; INTRAVENOUS at 12:39

## 2022-11-09 RX ADMIN — KETOROLAC TROMETHAMINE 30 MG: 15 INJECTION, SOLUTION INTRAMUSCULAR; INTRAVENOUS at 11:54

## 2022-11-09 RX ADMIN — FENTANYL CITRATE 25 MCG: 50 INJECTION INTRAMUSCULAR; INTRAVENOUS at 13:18

## 2022-11-09 RX ADMIN — FENTANYL CITRATE 50 MCG: 50 INJECTION, SOLUTION INTRAMUSCULAR; INTRAVENOUS at 10:02

## 2022-11-09 RX ADMIN — HYDROMORPHONE HYDROCHLORIDE 0.5 MG: 1 INJECTION, SOLUTION INTRAMUSCULAR; INTRAVENOUS; SUBCUTANEOUS at 12:59

## 2022-11-09 RX ADMIN — ONDANSETRON 4 MG: 2 INJECTION INTRAMUSCULAR; INTRAVENOUS at 10:10

## 2022-11-09 RX ADMIN — BUPIVACAINE HYDROCHLORIDE 20 ML: 5 INJECTION, SOLUTION EPIDURAL; INTRACAUDAL; PERINEURAL at 08:50

## 2022-11-09 RX ADMIN — METOCLOPRAMIDE 10 MG: 5 INJECTION, SOLUTION INTRAMUSCULAR; INTRAVENOUS at 15:08

## 2022-11-09 RX ADMIN — ROCURONIUM BROMIDE 30 MG: 10 INJECTION INTRAVENOUS at 10:15

## 2022-11-09 RX ADMIN — EPHEDRINE SULFATE 5 MG: 50 INJECTION INTRAVENOUS at 10:17

## 2022-11-09 RX ADMIN — DEXAMETHASONE SODIUM PHOSPHATE 10 MG: 4 INJECTION, SOLUTION INTRAMUSCULAR; INTRAVENOUS at 10:11

## 2022-11-09 RX ADMIN — PHENYLEPHRINE HYDROCHLORIDE 100 MCG: 10 INJECTION INTRAVENOUS at 10:10

## 2022-11-09 RX ADMIN — FENTANYL CITRATE 25 MCG: 50 INJECTION INTRAMUSCULAR; INTRAVENOUS at 14:07

## 2022-11-09 RX ADMIN — EPHEDRINE SULFATE 10 MG: 50 INJECTION INTRAVENOUS at 10:19

## 2022-11-09 RX ADMIN — FENTANYL CITRATE 25 MCG: 50 INJECTION INTRAMUSCULAR; INTRAVENOUS at 12:40

## 2022-11-09 RX ADMIN — LIDOCAINE HYDROCHLORIDE 100 MG: 20 INJECTION, SOLUTION INTRAVENOUS at 09:59

## 2022-11-09 RX ADMIN — TRANEXAMIC ACID 1000 MG: 1 INJECTION, SOLUTION INTRAVENOUS at 10:10

## 2022-11-09 RX ADMIN — PHENYLEPHRINE HYDROCHLORIDE 200 MCG: 10 INJECTION INTRAVENOUS at 10:12

## 2022-11-09 ASSESSMENT — PAIN DESCRIPTION - ORIENTATION
ORIENTATION: LEFT

## 2022-11-09 ASSESSMENT — PAIN - FUNCTIONAL ASSESSMENT
PAIN_FUNCTIONAL_ASSESSMENT: 0-10
PAIN_FUNCTIONAL_ASSESSMENT: PREVENTS OR INTERFERES WITH ALL ACTIVE AND SOME PASSIVE ACTIVITIES
PAIN_FUNCTIONAL_ASSESSMENT: PREVENTS OR INTERFERES SOME ACTIVE ACTIVITIES AND ADLS

## 2022-11-09 ASSESSMENT — PAIN DESCRIPTION - LOCATION
LOCATION: KNEE

## 2022-11-09 ASSESSMENT — PAIN SCALES - GENERAL
PAINLEVEL_OUTOF10: 3
PAINLEVEL_OUTOF10: 7
PAINLEVEL_OUTOF10: 0
PAINLEVEL_OUTOF10: 3
PAINLEVEL_OUTOF10: 7
PAINLEVEL_OUTOF10: 0
PAINLEVEL_OUTOF10: 0
PAINLEVEL_OUTOF10: 5
PAINLEVEL_OUTOF10: 0
PAINLEVEL_OUTOF10: 5
PAINLEVEL_OUTOF10: 1
PAINLEVEL_OUTOF10: 4
PAINLEVEL_OUTOF10: 1

## 2022-11-09 ASSESSMENT — PAIN DESCRIPTION - FREQUENCY: FREQUENCY: CONTINUOUS

## 2022-11-09 ASSESSMENT — PAIN DESCRIPTION - PAIN TYPE
TYPE: SURGICAL PAIN
TYPE: SURGICAL PAIN

## 2022-11-09 ASSESSMENT — PAIN DESCRIPTION - DESCRIPTORS
DESCRIPTORS: ACHING
DESCRIPTORS: ACHING
DESCRIPTORS: DISCOMFORT
DESCRIPTORS: ACHING;DISCOMFORT
DESCRIPTORS: ACHING;DISCOMFORT

## 2022-11-09 ASSESSMENT — PAIN DESCRIPTION - ONSET: ONSET: ON-GOING

## 2022-11-09 NOTE — PROGRESS NOTES
Procedure: Adductor Canal nerve block with Exparel  MD: Dr. Nicole Rashid performed. Pt monitored closely on heart monitor, 2L NC, continuous pulse oximetry, EtCO2, and frequent BPs. Pt remained alert and oriented x4. pt tolerated procedure well.

## 2022-11-09 NOTE — PROGRESS NOTES
PACU Transfer to [facility]    Procedure(s):  LEFT TOTAL KNEE REPLACEMENT    Pt's Current Allergies: Patient has no known allergies. Pt meets criteria to transfer to next phase of care per Jah Dyson and AUSTYN standards    No results for input(s): POCGLU in the last 72 hours. Vitals:    11/09/22 1606   BP: 139/64   Pulse: 89   Resp: 16   Temp: 97.5 °F (36.4 °C)   SpO2:            Intake/Output Summary (Last 24 hours) at 11/9/2022 1607  Last data filed at 11/9/2022 1509  Gross per 24 hour   Intake 180 ml   Output 50 ml   Net 130 ml       Pain assessment:  present - adequately treated  Pain Level: 5    Patient was assessed for unknown alterations to skin integrity. There were not unknown alterations observed. Patient transferred to care of Jere Moser RN.    Family updated and directed to Jere Moser    11/9/2022 4:07 PM

## 2022-11-09 NOTE — ANESTHESIA POSTPROCEDURE EVALUATION
Department of Anesthesiology  Postprocedure Note    Patient: Nasir Govea  MRN: [id number]  YOB: 1960  Date of evaluation: 11/9/2022      Procedure Summary     Date: 11/09/22 Room / Location: [address]    Anesthesia Start: 6684 Anesthesia Stop: 6197    Procedure: LEFT TOTAL KNEE REPLACEMENT (Left: Knee) Diagnosis:       Primary osteoarthritis of left knee      (Primary osteoarthritis of left knee [M17.12])    Surgeons: Jamar Dumont MD Responsible Provider: Eric Pimentel MD    Anesthesia Type: general, regional ASA Status: 2          Anesthesia Type: No value filed.     Adeola Phase I: Adeola Score: 10    Adeola Phase II:        Anesthesia Post Evaluation    Patient location during evaluation: PACU  Level of consciousness: awake  Complications: no  Multimodal analgesia pain management approach

## 2022-11-09 NOTE — PROGRESS NOTES
Pt utilizing IS effectively. Weaning off O2 at this time. Still coughing and drinking fluids. Pt states this happens every morning when she wakes due to drainage from allergies. She states she feels very tired and could go to sleep, but does not want to. Will monitor closely.

## 2022-11-09 NOTE — PROGRESS NOTES
Pt tolerating PO. She does have a consistent cough that is bugging her.  Reassessing frequently to see if patient is ready for PT/OT eval.  updated and VU

## 2022-11-09 NOTE — PROGRESS NOTES
Procedure(s):  LEFT TOTAL KNEE REPLACEMENT    Current Allergies: Patient has no known allergies. No results for input(s): POCGLU in the last 72 hours. Admitted to PACU bed 16 from OR. Arrived on a stretcher . Attached to PACU monitoring system. Alarms and parameters set. Report received from anesthesia personnel. OR staff did not report skin issues that were observed while in OR  Pt arrived with oxygen per nasal cannula with oxygen at 4 liters. Athrombic wraps in place.

## 2022-11-09 NOTE — PROGRESS NOTES
Occupational Therapy  Facility/Department: [address] [place] [address]  Occupational Therapy Initial Assessment and Treatment  Discharge    Name: Jenae Urena  : 1960  MRN: [id number]  Date of Service: 2022    Discharge Recommendations:  Jenae Urena scored a 20/24 on the AM-PAC ADL Inpatient form. Current research shows that an AM-PAC score of 18 or greater is typically associated with a discharge to the patient's home setting. OT Equipment Recommendations  Equipment Needed: Yes  Mobility Devices: ADL Assistive Devices  ADL Assistive Devices: Shower Chair with back; Toilet Safety Frame       Patient Diagnosis(es): The encounter diagnosis was Status post left knee replacement. Past Medical History:  has a past medical history of COPD (chronic obstructive pulmonary disease) ([address] and Wears glasses. Past Surgical History:  has a past surgical history that includes Ramey tooth extraction;  section; Dilation and curettage of uterus; and Knee arthroscopy (Left). Assessment   Assessment: Pt seen POD#0 s/p L TKR. Pt demonstrating safety and CGA level for functional transfers/mobility, and ADLs. Plans for return home w/ initial 24 hr A of spouse. Educated in modified ADL/transfer techniques and pt verbalized/demo learning. Recommend shower chair and RTS/toilet safety -pt receptive to recommendations. No OT goals set as pt to discharge today. Decision Making: Low Complexity  REQUIRES OT FOLLOW-UP: No  Activity Tolerance  Activity Tolerance: Patient Tolerated treatment well        Plan   Occupational Therapy Plan  Discharge acute OT - will sign off. Restrictions  Position Activity Restriction  Other position/activity restrictions: WBAT    Subjective   General  Chart Reviewed: Yes  Additional Pertinent Hx: 57 y.o. F to OR  for L TKR. PMH: COPD, L Knee Arthroscopy.   Family / Caregiver Present: No  Referring Practitioner: Melani Monteiro PA-C  Diagnosis: Primary OA L Knee  Subjective  Subjective: Seen in PACU. Plans to stay in hotel w/ spouse overnight and head home tomorrow (after PT). States spouse will provide 24 hr A initially. surgical pain \"minimal\" - not rated, nurse present  Social/Functional History  Social/Functional History  Lives With: Spouse  Type of Home: House  Home Layout: Two level, Bed/Bath upstairs  Home Access: Stairs to enter with rails  Entrance Stairs - Number of Steps: 4 MELISSA; 13 Steps to second floor bedroom & bathroom  Bathroom Shower/Tub: Tub/Shower unit (upstairs but does have a walk in shower in basement & there are less steps to basement)  Home Equipment: Crutches, Walker, rolling (crutches are old & wooden)  Has the patient had two or more falls in the past year or any fall with injury in the past year?: No  ADL Assistance: Independent  Homemaking Assistance: Independent  Ambulation Assistance: Independent  Transfer Assistance: Independent  Active : Yes  Occupation: Full time employment  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  Educated in modified technique using unilateral leverage and reach back to toilet w/ CGA  Tub Transfers  Tub Transfers Comments: Note: has tub shower on bedroom level - discussed recommendation for shower chair and educated in transfer technique stated understanding; if using shower (in basement level) - discussed recommendation for shower chair for energy conservation and improved safety - stated understnading. AROM: Within functional limits  Strength: Within functional limits  Coordination: Within functional limits  ADL  Grooming: Contact guard assistance (washing hands at sink level, standing)  UE Dressing: Setup (setup, seated)  LE Dressing: Contact guard assistance;Verbal cueing; Increased time to complete (underwear, pants)  Toileting: Contact guard assistance ((+) void)     Activity Tolerance  Activity Tolerance: Patient tolerated evaluation without incident  Activity Tolerance Comments: groggy throughout session  Bed mobility  Supine to Sit: Stand by assistance  Sit to Supine: Stand by assistance  Transfers  Sit to stand: Contact guard assistance  Stand to sit: Contact guard assistance  Vision  Vision: Impaired  Vision Exceptions: Wears glasses at all times  Hearing  Hearing: Within functional limits  Cognition  Overall Cognitive Status: WFL  Orientation  Overall Orientation Status: Within Normal Limits                  Education Given To: Patient  Education Provided: Role of Therapy;Plan of Care;Precautions; ADL Adaptive Strategies;Transfer Training  Education Method: Verbal  Barriers to Learning: None  Education Outcome: Verbalized understanding;Demonstrated understanding                    Pt seen by OT for eval and treat.  Treatment included: bed mobility, functional transfers/mobility, ADL, pt education                                                      AM-PAC Score        AM-[facility] Inpatient Daily Activity Raw Score: 20 (11/09/22 1613)  -PAC Inpatient ADL T-Scale Score : 42.03 (11/09/22 1613)  ADL Inpatient CMS 0-100% Score: 38.32 (11/09/22 1613)  ADL Inpatient CMS G-Code Modifier : CJ (11/09/22 1613)           Therapy Time   Individual Concurrent Group Co-treatment   Time In 6510         Time Out 1600         Minutes 61          Timed Code Treatment Minutes:   46    Total Treatment Minutes:  [address] OTR/L #3149

## 2022-11-09 NOTE — PROGRESS NOTES
Physical Therapy  Facility/Department: [address] [place] [address]  Physical Therapy Initial Assessment/Treatment/Possible DC    Name: Benitez Raman  : 1960  MRN: [id number]  Date of Service: 2022    Discharge Recommendations:Marly Rebollar scored a 18/24 on the AM-PAC short mobility form. Current research shows that an AM-PAC score of 18 or greater is typically associated with a discharge to the patient's home setting. Based on the patient's AM-PAC score and their current functional mobility deficits, it is recommended that the patient have 2-3 sessions per week of Physical Therapy at d/c to increase the patient's independence. At this time, this patient demonstrates the endurance and safety to discharge home with  (OP PT services) and a follow up treatment frequency of 2-3x/wk. Please see assessment section for further patient specific details. If patient discharges prior to next session this note will serve as a discharge summary. Please see below for the latest assessment towards goals. PT Equipment Recommendations  Equipment Needed: Yes  Mobility Devices: Crutches  Crutches: Axillary  Other: issued      Patient Diagnosis(es): The encounter diagnosis was Status post left knee replacement. Past Medical History:  has a past medical history of COPD (chronic obstructive pulmonary disease) ([address] and Wears glasses. Past Surgical History:  has a past surgical history that includes [place] tooth extraction;  section; Dilation and curettage of uterus; and Knee arthroscopy (Left). Assessment   Assessment: 58 yo seen POD 0 from L TKA. Pt demo slow but steady gait with use of walker. Able to go up/down few steps with rail/crutch. Pt issued new crutches for use on stairs- plans to use her walker at home the rest of the time. Advised to have spouse physically walk with her for few days with gait belt on especially today since groggy from surgery.  Demo good knee ROM- encouraged to exercise & plans to start OP PT tomorrow. Staying in hotel tonight & recommend pt practice stairs again at PT tomorrow prior to return home. Decision Making: Low Complexity  Requires PT Follow-Up: Yes  Activity Tolerance  Activity Tolerance: Patient tolerated evaluation without incident  Activity Tolerance Comments: groggy throughout session     Plan   Physcial Therapy Plan  General Plan: 2 times a day 7 days a week  Current Treatment Recommendations: Strengthening, ROM, Balance training, Functional mobility training, Gait training, Patient/Caregiver education & training, Stair training  Safety Devices  Type of Devices:  (left on stretcher with RN next to pt, needs in reach)     Restrictions  Position Activity Restriction  Other position/activity restrictions: WBAT     Subjective   Pain: surgical pain \"minimal\" - not rated, nurse present  General  Chart Reviewed: Yes  Additional Pertinent Hx: LEFT TOTAL KNEE REPLACEMENT on 11/9/22. Family / Caregiver Present: No  Referring Practitioner: Magaly Anderson PA-C  Diagnosis: OA L knee  Follows Commands: Within Functional Limits  Subjective  Subjective: Found in bed in PACU, agreeable to PT. Reports feeling very sleepy. Minimal pain.          Social/Functional History  Social/Functional History  Lives With: Spouse  Type of Home: House  Home Layout: Two level, Bed/Bath upstairs  Home Access: Stairs to enter with rails  Entrance Stairs - Number of Steps: 4 MELISSA; 13 Steps to second floor bedroom & bathroom  Bathroom Shower/Tub: Tub/Shower unit (upstairs but does have a walk in shower in basement & there are less steps to basement)  Home Equipment: Crutches, Walker, rolling (crutches are old & wooden)  Has the patient had two or more falls in the past year or any fall with injury in the past year?: No  ADL Assistance: Independent  Homemaking Assistance: Independent  Ambulation Assistance: Independent  Transfer Assistance: Independent  Active : Yes  Occupation: Full time employment  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  Vision/Hearing  Vision  Vision: Impaired  Vision Exceptions: Wears glasses at all times  Hearing  Hearing: Within functional limits    Cognition   Orientation  Overall Orientation Status: Within Normal Limits     Objective           AROM RLE (degrees)  RLE AROM: WFL  AROM LLE (degrees)  LLE General AROM: 10-60  Strength RLE  Strength RLE: WFL  Strength LLE  Comment: impaired due to surgery     Bed mobility  Supine to Sit: Stand by assistance  Sit to Supine: Stand by assistance  Transfers  Sit to Stand: Contact guard assistance;Stand by assistance (cues for hand placement, progressing to SBA)  Stand to Sit: Stand by assistance  Ambulation  WB Status: WBAT  Ambulation  Device: Rolling Walker  Assistance: Contact guard assistance  Quality of Gait: steady, slow gait, no LOB  Distance: 10 x 2', 45' x 2  Comments: Limited gait due to grogginess  Stairs/Curb  Stairs?: Yes (up/down 5 steps with 1 crutch/1 rail CGA. Educated in proper sequencing on steps & advised to have spouse A.)     Balance  Sitting - Static: Good (SBA sitting EOB)  Exercise Treatment: Demo independence with APs, QS, 1 rep HS indep. Pt going to OP PT in AM.  Encouraged pt to do exercises & ice tonight. Treatment included gait/transfer training, pt education.                                               AM-PAC Score  AM-PAC Inpatient Mobility Raw Score : 18 (11/09/22 1610)  -PAC Inpatient T-Scale Score : 43.63 (11/09/22 1610)  Mobility Inpatient CMS 0-100% Score: 46.58 (11/09/22 1610)  Mobility Inpatient CMS G-Code Modifier : CK (11/09/22 1610)           Goals  Short Term Goals  Time Frame for Short Term Goals: N/A- anticipate home today       Education  Patient Education  Education Given To: Patient  Education Provided: Role of Therapy;Plan of Care  Education Provided Comments: spouse to provide A with gait/steps, no pillow under knee, TKR ex & ice  Education Method: Demonstration;Verbal  Barriers to Learning:  (brandie)  Education Outcome: Verbalized understanding;Continued education needed;Demonstrated understanding      Therapy Time   Individual Concurrent Group Co-treatment   Time In 1503         Time Out 1602         Minutes 59          Timed Code Treatment Minutes:   40    Total Treatment Minutes:  [address], [address] [facility] [place]

## 2022-11-09 NOTE — PROGRESS NOTES
Pt desaturating to low 80s frequently on arrival from PACU to [facility]. Patient very sleepy. Patient stimulated and pt encouraged to take deep breaths. IS given and instructed on use.

## 2022-11-09 NOTE — H&P
H&P Update     An electronic and hard copy history and physical was reviewed in the patient's chart. Date of Surgery Update: November 9, 2022  Mary Grace Parnell was seen and examined.

## 2022-11-09 NOTE — DISCHARGE INSTRUCTIONS
Total Knee Replacement  Discharge Instructions    To prevent Clot formation, you have been placed on the following medication:  ASA  Surgical Site Care:  Dressing change every 5-7 days with Mepilex dressing by [address] until incision healed  Staples will be removed on post-operative day 10-12 and steri-strips applied  Showering is permitted if waterproof dressing is applied or when sutures are removed and all areas of incision are healed. Physical Therapy:  Weight Bearing Status:  WBAT  3 times per week via Home Health    Precautions  Per Physical Therapy Handout  Pain Medications  You were given oxycodone/acetaminophen (Percocet)  Wean off pain medications as you deem appropriate as long as pain is under control  Cold packs/Ice packs/Machine  May be used 3 times daily for 15-30 minutes as necessary  Be sure to have a barrier (cloth, clothing, towel) between the site and the ice pack to prevent frostbite  Contact our office if  Increased redness, swelling, drainage of any kind, and/or pain to surgery site. As well as new onset fevers and or chills. These could signify an infection. Calf or thigh tenderness to touch as well as increased swelling or redness. This could signify a clot formation. Numbness or tingling to an area around the incision site or below the incision site (toes). Any rash appears, increased  or new onset nausea/vomiting occur. This may indicate a reaction to a medication. Phone # [phone] - [facility]. Follow up with Surgeon at scheduled appointment time. Board Certified Orthopaedic Surgeon  President and [address] and [address]    There are potential side effects of anesthesia or sedation you may experience for the first 24 hours.   These side effects include:    Confusion or Memory loss, Dizziness, or Delayed Reaction Times   [x]A responsible person should be with you for the next 24 hours. Do not operate any vehicles (automobiles, bicycles, motorcycles) or power tools or machinery for 24 hours. Do not sign any legal documents or make any legal decisions for 24 hours. Do not drink alcohol for 24 hours or while taking narcotic pain medication. Nausea    [x]Start with light diet and progress to your normal diet as you feel like eating. However, if you experience nausea or repeated episodes of vomiting which persist beyond 12-24 hours, notify your physician. Once nausea has passed, remember to keep drinking fluids. Difficulty Passing Urine  [x]Drink extra amounts of fluid today. Notify your physician if you have not urinated within 8 hours after your procedure or you feel uncomfortable. Irritated Throat from a Breathing Tube  [x]Drink extra amounts of fluid today. Lozenges may help. Muscle Aches  [x]You may experience some generalized body aches as your muscles recover from medications used to relax them during surgery. These will gradually subside. MEDICATION INSTRUCTIONS:  [x]Prescription(S) x   3 were called to [address]. [x]Give the list of your medications to your primary care physician on your next visit. Keep your med list updated and carry it with in case of emergencies. [x] Narcotic pain medications can cause the side effect of significant constipation. You may want to add a stool softener to your postoperative medication schedule or speak to your surgeon on how best to manage this side effect. NARCOTIC SAFETY:  Your pain medicine is only for you to take. Safely store your medicines. Store pills up high and out of reach of children and pets. Ensure safety caps are snapped tightly  Keep track of how many pills you have left    Unused medication can be disposed of by taking them to a drop-off box or take-back program that is authorized by the [facility].   Access to a site near you can be found on the [facility] website ([facility]. [facility]). If you have a CPAP machine, it is very important that you use it daily during all periods of sleep and daytime rest during your recovery at home. Surgery and Anesthesia place a significant amount of stress on your body. Using your CPAP will help keep you safe and lessen the negative effects of that stress. FOLLOW-UP RECOVERY CARE:  [x]Call the office at [phone] for follow-up appointment and problems    Watch for these possible complications, symptoms, or side effects of anesthesia. Call physician if they or any other problems occur:  Signs of INFECTION   > Fever over 101°     > Redness, swelling, hardness or warmth at the operative site   >Foul smelling or cloudy drainage at the operative site   Unrelieved PAIN  Unrelieved NAUSEA  Blood soaked dressing. (Some oozing may be normal)  Inability to urinate      Numb, pale, blue, cold or tingling extremity        The above instructions were reviewed with patient/significant other. The following additional patient specific information was reviewed with the patient/significant other:  []Procedure/physician specific instructions  []Medication information sheet(S) including potential side effects  []Maribels egress test  []Pain Ball management  []FAQ Catheter associated blood stream infections  []FAQ Surgical Site Infections  []Other-    I have read and understand the instructions given to me: ____________________________________________   (Patient/S.O. Signature)            Date/time 11/9/2022 12:00 PM         PACU:  [phone]   M-F 700 AM - 7 PM      SAME DAY SERVICES:  [phone] M-F 7AM-6PM        If you smoke STOP. We care about your health!        Your Surgeon or Anesthesiologist gave you Exparel     Exparel (bupivicaine liposome injectable suspension) is a medication injected into the surgical incision  just before the end of the procedure by your surgeon to help control your pain after surgery. It can also be given as a nerve block by the anesthesiologist. This local anesthetic provides pain relief by numbing the tissue around the surgical site. Exparel releases pain medication over time lasting up to 5 days or 120 hours. Exparel may cause a temporary loss of sensation or the ability to move in the area where the medication was injected. Side effects of Exparel that may occur include nausea, vomiting or constipation. Call immediately if you experience numbness or tingling of the mouth or lips, lightheadedness or severe anxiety. Notify your physician if you experience these or any other possible side effects of the medication. Note: Other forms of bupivacaine should not be administered within 96 hours (4 days) following administration of Exparel. Please keep ID band in place for 96 hours (4 days).

## 2022-11-09 NOTE — ANESTHESIA PROCEDURE NOTES
Peripheral Block    Patient location during procedure: pre-op  Reason for block: post-op pain management and at surgeon's request  Start time: 11/9/2022 8:50 AM  End time: 11/9/2022 8:50 AM  Staffing  Performed: anesthesiologist   Anesthesiologist: Saul Baez MD  Preanesthetic Checklist  Completed: patient identified, IV checked, site marked, risks and benefits discussed, surgical/procedural consents, equipment checked, pre-op evaluation, timeout performed, anesthesia consent given, oxygen available, monitors applied/VS acknowledged, fire risk safety assessment completed and verbalized and blood product R/B/A discussed and consented  Peripheral Block   Patient position: supine  Prep: ChloraPrep  Provider prep: mask and sterile gloves  Patient monitoring: cardiac monitor, continuous pulse ox, frequent blood pressure checks and IV access  Block type: Femoral  Adductor canal (Low Femoral)  Laterality: left  Injection technique: single-shot  Guidance: ultrasound guided  Local infiltration: lidocaine  Infiltration strength: 1 %  Local infiltration: lidocaine  Dose: 3 mL    Needle   Needle type: insulated echogenic nerve stimulator needle   Needle gauge: 21 G  Needle localization: ultrasound guidance  Needle length: 10 cm  Assessment   Injection assessment: negative aspiration for heme, no paresthesia on injection and local visualized surrounding nerve on ultrasound  Paresthesia pain: immediately resolved  Slow fractionated injection: yes  Hemodynamics: stable  Real-time US image taken/store: yes  Outcomes: uncomplicated and patient tolerated procedure well    Additional Notes  20 ml 0.5% bupi with 10 ml 1.3% Exparel left adductor canal.  30 mg Propofol. Sterility maintained.             Medications Administered  bupivacaine (PF) 0.5 % - Perineural   20 mL - 11/9/2022 8:50:00 AM  bupivacaine liposome 1.3 % - Perineural   10 mL - 11/9/2022 8:50:00 AM

## 2022-11-09 NOTE — PROGRESS NOTES
When asked if patient is in pain, she nods her head. When asked to describe it or to rate the intensity, patient does not respond and turns her head the other way.  Will continue to assess as patient arouses from anesthesia

## 2022-11-10 ENCOUNTER — HOSPITAL ENCOUNTER (OUTPATIENT)
Dept: PHYSICAL THERAPY | Age: 62
Setting detail: THERAPIES SERIES
Discharge: HOME | End: 2022-11-10
Payer: COMMERCIAL

## 2022-11-10 ENCOUNTER — TELEPHONE (OUTPATIENT)
Dept: ORTHOPEDIC SURGERY | Age: 62
End: 2022-11-10

## 2022-11-10 LAB
EKG ATRIAL RATE: 69 BPM
EKG DIAGNOSIS: NORMAL
EKG P AXIS: 47 DEGREES
EKG P-R INTERVAL: 172 MS
EKG Q-T INTERVAL: 394 MS
EKG QRS DURATION: 98 MS
EKG QTC CALCULATION (BAZETT): 422 MS
EKG R AXIS: -5 DEGREES
EKG T AXIS: 52 DEGREES
EKG VENTRICULAR RATE: 69 BPM

## 2022-11-10 PROCEDURE — 93010 ELECTROCARDIOGRAM REPORT: CPT | Performed by: INTERNAL MEDICINE

## 2022-11-10 PROCEDURE — 97110 THERAPEUTIC EXERCISES: CPT | Performed by: PHYSICAL THERAPIST

## 2022-11-10 PROCEDURE — 97530 THERAPEUTIC ACTIVITIES: CPT | Performed by: PHYSICAL THERAPIST

## 2022-11-10 NOTE — PROGRESS NOTES
[facility] and [address],  Sports Performance and Rehabilitation, [facility] [address]  [address] [facility],5[address] Floor   Carla Leon  Phone: [phone]  Fax: [phone]      Physical Therapy Daily Treatment Note  Date:  11/10/2022    Patient Name:  Mihir Frank    :  1960  MRN: [id number]  Restrictions/Precautions:    Medical/Treatment Diagnosis Information:  Diagnosis: M17.12 (ICD-10-CM) - Primary osteoarthritis of left knee  Treatment Diagnosis: M25.562 L Knee Pain, M25.662 L Knee Stiffness, R53.1 Weakness, R26.2 Difficulty with walking  S/P L TKR  DOS: 22  Meds: Percocet, Aspirin  Insurance/Certification information:  PT Insurance Information: [place] - out of network; paying OOP  Physician Information:   Nawaf Pace MD  Has the plan of care been signed (Y/N):        [x]  Yes  []  No     Date of Patient follow up with Physician: Patient seen in consultation with Dr. Jessenia Bunch who established the initial/subsequent treatment protocol. 11-10-22: seen by PA      Is this a Progress Report:     [x]  Yes  []  No        If Yes:  Date Range for reporting period:  Beginnin22  Endin22    Progress report will be due (10 Rx or 30 days whichever is less):        Recertification will be due (POC Duration  / 90 days whichever is less): 22         Visit # Insurance Allowable Auth Required   2 Out of network []  Yes []  No        OUTCOME MEASURE DATE SCORE   FOTO NV NV               Latex Allergy:  [x]NO      []YES  Preferred Language for Healthcare:   [x]English       []other:    Pain level:  7-9/10     SUBJECTIVE: Pt is 1 day PO following TKR. Doing okay. Pretty painful. Denies any other symptoms. Gets tired quickly when up and moving around. Plans to return home after PT today and is returning for PT next week.      OBJECTIVE:   10/13/22  Flexibility L R Comment   Hamstring -20 -10 90/90   Gastroc To Neutral To Neutral     ITB WNL WNL Quad Mod tightness WNL                  11/10/22          ROM PROM AROM Overpressure Comment     L R L R L R     Flexion 67 138             Extension -10 cold  -7 best 0                                                    10/13/22  Strength L R Comment   Quad 4 5     Hamstring 4 5     Gastroc NT NT     Hip  flexion 4- 4     Hip abd 4- 4                               Special Test Results/Comment   Homans Neg   Temp Neg      NV  Girth L R   Mid Patella       Suprapatellar       5cm above       15cm above          TEST INITIAL pre-op 9/20   GOAL   SINGLE LEG STANCE TIME L: 2 SEC     R: 16 SEC   >25 SECONDS   STAIR CLIMB TEST 16 SEC       TIMED UP And GO 10 SEC   61-77 y/o: <9sec  66-76 y/o: <10sec  80-81 y/o: <12 sec         Reflexes/Sensation:               [x]Dermatomes/Myotomes intact               []Reflexes equal and normal bilaterally              []Other:     Joint mobility:                [x]Normal                      []Hypo              []Hyper     Palpation: generalized TTP around knee and thigh     Functional Mobility/Transfers: min-modA to lift leg & change positions     Posture: flexed knee     Bandages/Dressings/Incisions: PO bandages removed - local bandage in place with minimal drainage present     Gait: (include devices/WB status) antalgic, flexed knee, stiff-legged, RW - safe mechanics with transfers and gait       RESTRICTIONS/PRECAUTIONS: L TKR    Exercises/Interventions:   Exercise/Equipment Resistance/Repetitions Other comments   Stretching     Hamstring 3 x 30\"    Towel Pull 3 x 30\"    Inclined Calf     Hip Flexion     ITB     Groin     Quad                    SLR     Supine NV    Abduction     Adduction     Prone     SLR+          Isometrics     Quad sets 10 x 10\"    Ball squeeze 10 x 10\"         Patellar Glides     Medial     Superior     Inferior          ROM     Sheet Pulls     Hang Weights 15' Neutral leg w/ ice   Passive 10 x 10\" EOB well-leg mob   Active     Weight Shift     Ankle Pumps 30x                   CKC     Calf raises     Wall sits     Step ups     1 leg stand     Squatting     CC TKE     Balance     bridges          PRE     Extension  RANGE:   Flexion  RANGE:        Quantum machines     Leg press      Leg extension     Leg curl          Manual interventions                 HEP instruction & Full Post-op instructions provided:   Access Code: [email or web address]  URL: [email or web address]/  Date: 11/10/2022  Prepared by: Hyun Aguilar    Exercises  Supine Ankle Pumps - 12 x daily - 7 x weekly - 3 sets - 10 reps  Long Sitting Quad Set - 12 x daily - 7 x weekly - 1 sets - 5 reps - 10 hold  Seated Table Hamstring Stretch - 3 x daily - 7 x weekly - 1 sets - 5 reps - 30 hold  Long Sitting Calf Stretch with Strap - 3 x daily - 7 x weekly - 1 sets - 5 reps - 30 hold  Long Sitting Isometric Hip Adduction and Extension with Ball at Knees - 3 x daily - 7 x weekly - 1 sets - 10 reps - 10 hold  Supine Straight Leg Raises - 3 x daily - 7 x weekly - 3 sets - 5-10 reps  Supine Knee Extension Stretch on Towel Roll - 3-6 x daily - 7 x weekly - 1 reps - 10 min hold  Seated Active Assistive Knee Flexion and Extension (Mirrored) - 3-6 x daily - 7 x weekly - 1 sets - 10 reps - 10 sec hold         Therapeutic Exercise and NMR EXR  [x] (22144) Provided verbal/tactile cueing for activities related to strengthening, flexibility, endurance, ROM for improvements in LE, proximal hip, and core control with self care, mobility, lifting, ambulation.  [] (64576) Provided verbal/tactile cueing for activities related to improving balance, coordination, kinesthetic sense, posture, motor skill, proprioception  to assist with LE, proximal hip, and core control in self care, mobility, lifting, ambulation and eccentric single leg control.      NMR and Therapeutic Activities:    [x] (09354 or 56339) Provided verbal/tactile cueing for activities related to improving balance, coordination, kinesthetic sense, posture, motor skill, proprioception and motor activation to allow for proper function of core, proximal hip and LE with self care and ADLs  [x] (18000) Gait Re-education- Provided training and instruction to the patient for proper LE, core and proximal hip recruitment and positioning and eccentric body weight control with ambulation re-education including up and down stairs     Home Exercise Program:    [x] (52561) Reviewed/Progressed HEP activities related to strengthening, flexibility, endurance, ROM of core, proximal hip and LE for functional self-care, mobility, lifting and ambulation/stair navigation   [] (40175)Reviewed/Progressed HEP activities related to improving balance, coordination, kinesthetic sense, posture, motor skill, proprioception of core, proximal hip and LE for self care, mobility, lifting, and ambulation/stair navigation      Manual Treatments:  PROM / STM / Oscillations-Mobs:  G-I, II, III, IV (PA's, Inf., Post.)  [x] (88939) Provided manual therapy to mobilize LE, proximal hip and/or LS spine soft tissue/joints for the purpose of modulating pain, promoting relaxation,  increasing ROM, reducing/eliminating soft tissue swelling/inflammation/restriction, improving soft tissue extensibility and allowing for proper ROM for normal function with self care, mobility, lifting and ambulation.      Modalities: Vaso 15'     Charges:  Timed Code Treatment Minutes: 30'   Total Treatment Minutes: 39'       [] EVAL (LOW) 15905 (typically 20 minutes face-to-face)  [] EVAL (MOD) 83184 (typically 30 minutes face-to-face)  [] EVAL (HIGH) 87671 (typically 45 minutes face-to-face)  [] RE-EVAL   [x] FO(88854) x 1     [] IONTO  [] NMR (70509) x      [] VASO  [] Manual (40712) x      [] Other:  [x] TA x  1     [] Mech Traction (74949)  [] ES(attended) (34880)      [] ES (un) (08381):     GOALS:   Patient stated goal: Return to PLOF without restrictions    [] Progressing: [] Met: [] Not Met: [] Adjusted     Therapist goals for Patient: Short Term Goals: To be achieved in: 2 weeks  1. Independent in HEP and progression per patient tolerance, in order to prevent re-injury. [] Progressing: [] Met: [] Not Met: [] Adjusted   2. Patient will have a decrease in pain to facilitate improvement in movement, function, and ADLs as indicated by Functional Deficits. [] Progressing: [] Met: [] Not Met: [] Adjusted     Long Term Goals: To be achieved in: 12 weeks PO  1. Disability index score of 50+ on FOTO to assist with reaching prior level of function. [] Progressing: [] Met: [] Not Met: [] Adjusted  2. Patient will demonstrate increased AROM to 0-130 deg to allow for proper joint functioning as indicated by patients Functional Deficits. [] Progressing: [] Met: [] Not Met: [] Adjusted  3. Patient will demonstrate an increase in Strength to good proximal hip strength and control, within 5lb HHD in LE to allow for proper functional mobility as indicated by patients Functional Deficits. [] Progressing: [] Met: [] Not Met: [] Adjusted  4. Patient will return to household functional activities without increased symptoms or restriction. [] Progressing: [] Met: [] Not Met: [] Adjusted  5. Pt will be able to ascend a flight of stairs reciprocally without restrictions. (patient specific functional goal)    [] Progressing: [] Met: [] Not Met: [] Adjusted          Overall Progression Towards Functional goals/ Treatment Progress Update:  [] Patient is progressing as expected towards functional goals listed. [] Progression is slowed due to complexities/Impairments listed. [] Progression has been slowed due to co-morbidities.   [x] Plan just implemented, too soon to assess goals progression <30days   [] Goals require adjustment due to lack of progress  [] Patient is not progressing as expected and requires additional follow up with physician  [] Other    Prognosis for POC: [x] Good [] Fair  [] Poor      Patient requires continued skilled intervention: [x] Yes  [] No    Treatment/Activity Tolerance:  [x] Patient able to complete treatment  [] Patient limited by fatigue  [] Patient limited by pain     [] Patient limited by other medical complications  [x] Other: Pt doing well for 1-day PO. No concerning findings. Compared to pre-operatively, pt has substantial restrictions in normal function d/t decreased ROM, strength and pain present. Pt would benefit from regular skilled PT intervention to address deficits and allow a full, safe return to PLOF. PLAN: See eval  [] Continue per plan of care [] Alter current plan (see comments above)  [x] Plan of care initiated [] Hold pending MD visit [] Discharge      Electronically signed by:  Ky Ferreira, PT, DPT    Note: If patient does not return for scheduled/ recommended follow up visits, this note will serve as a discharge from care along with most recent update on progress.

## 2022-11-10 NOTE — OP NOTE
[facility]                [address]                                OPERATIVE REPORT    PATIENT NAME: Noah Medina                   :        1960  MED REC NO:   [id number]                          ROOM:  ACCOUNT NO:   [de-identified]                           ADMIT DATE: 2022  PROVIDER:     Rossana Miles MD    DATE OF PROCEDURE:  2022    PREOPERATIVE DIAGNOSIS:  Tricompartmental osteoarthritis, left knee. POSTOPERATIVE DIAGNOSIS:  Tricompartmental osteoarthritis, left knee. OPERATION PERFORMED:  Left total knee joint replacement with Journey II  system utilizing Visionaire patient-specific cutting block system, #6  femur, #5 tibia, 11-mm polyethylene insert with 35 x 7-mm patella  insert. SURGEON:  Rossana Miles MD    FIRST ASSISTANT:  Villa Sanchez PA-C    SECOND ASSISTANT:  Kelvin Meza MD    ANESTHESIA:  General.    OPERATIVE INDICATIONS:  This patient understood the associated risks,  benefits, and consented to the operative procedure, having advancing  symptoms that affected all activities of daily living and having failed  a nonoperative treatment protocol. OPERATIVE FINDINGS:  Very excellent installation of a well-balanced  total knee replacement. The Visionaire system provided the added  patient-specific cutting block system to her own anatomy. A physician  assistant was present throughout the procedure and assisted in the  patient positioning, the surgical procedure which increased OR  efficiency and decreased OR time. COMPLICATIONS:  There were no complications. ESTIMATED BLOOD LOSS:  75 mL. OPERATIVE PROCEDURE:  This patient was placed in supine position upon  the operating room table and after satisfactory level of general  anesthesia, the left knee was prepped and draped to sterile surgical  field after identification of the signed limb and the time-out  procedure.   A triple antibiotic protocol was utilized with double  antibiotics in the irrigation system including systemic antibiotics. In  addition, the tranexamic acid protocol was utilized both locally after  the prosthesis was installed and as well systemically. Intravenous  antibiotics were also utilized. Vancomycin powder was utilized upon closure. The anesthesia consisted  besides the general anesthesia, the postoperative anesthesia utilizing  an adductor long-acting block and as well the local Orthomix that I  placed circumferentially about the knee. Under tourniquet control, a medial parapatellar incision was made with  dissection carried through the skin and subcutaneous tissue, everting  the patella, cutting this to a 15 mm base that was markedly sclerotic,  preserving the lateral facet thickness as best as possible with three  sockets and drilling the undersurface for cement integration. With the femur in 90 degrees knee flexion, the patient-specific cutting  block provided an excellent fit, distal resection performed, #6 chamfer  block applied, five chamfer cuts made, bone fragments removed, trial  prosthesis placed, full extension obtained to the knee, and a center  trough technique used. With again the tibia in 90 degrees of knee flexion, the patient-specific  cutting block was applied, provided an excellent fit, proximal resection  performed, the knee gapped open and all remaining meniscus and spurs  removed, electrocoagulation of meniscus beds, placement of the tibia  prosthesis with 11 mm provided excellent stability throughout knee  flexion, both at low, mid, and high flexion and this was marked for  rotation. The center gouge technique utilized at this point.     The knee was prepared for the cementing procedure first with the double  irrigation with antibiotics and Cloracef followed by drying all the  surfaces with irrigation followed by proceeding in a meticulous manner  first with the tibia followed by the femur followed by the patella  removing cement at every step. When the cement cured, the tourniquet  was deflated. Hemostasis was excellent and meticulous with a dry knee  obtained. Final double irrigation with antibiotics and then  installation of the 11-mm permanent polyethylene. The routine closure, first balancing the extensor mechanism with  interrupted 0 Ethibond followed by dead space closure, followed by 3-0  and 4-0 Monocryl. Ace cotton compression dressing was applied. The  patient was returned to recovery unit in excellent condition.         Lita Carr MD    D: 11/09/2022 11:54:26       T: 11/09/2022 11:58:45     [email or web address]  Job#: [id number]     Doc#: [id number]    CC:

## 2022-11-15 ENCOUNTER — HOSPITAL ENCOUNTER (OUTPATIENT)
Dept: PHYSICAL THERAPY | Age: 62
Setting detail: THERAPIES SERIES
Discharge: HOME | End: 2022-11-15
Payer: COMMERCIAL

## 2022-11-15 PROCEDURE — 97110 THERAPEUTIC EXERCISES: CPT | Performed by: PHYSICAL THERAPIST

## 2022-11-15 PROCEDURE — 97016 VASOPNEUMATIC DEVICE THERAPY: CPT | Performed by: PHYSICAL THERAPIST

## 2022-11-15 PROCEDURE — 97140 MANUAL THERAPY 1/> REGIONS: CPT | Performed by: PHYSICAL THERAPIST

## 2022-11-15 NOTE — FLOWSHEET NOTE
[facility] and [address],  Sports Performance and Rehabilitation, [facility] [address] [place] [address]  [address] [facility],5Th Floor   Carla Leon  Phone: [phone]  Fax: [phone]      Physical Therapy Daily Treatment Note  Date:  11/15/2022    Patient Name:  Kevin Resendez    :  1960  MRN: [id number]  Restrictions/Precautions:    Medical/Treatment Diagnosis Information:  Diagnosis: M17.12 (ICD-10-CM) - Primary osteoarthritis of left knee  Treatment Diagnosis: M25.562 L Knee Pain, M25.662 L Knee Stiffness, R53.1 Weakness, R26.2 Difficulty with walking  S/P L TKR  DOS: 22  Meds: Percocet, Aspirin  Insurance/Certification information:  PT Insurance Information: [place] - out of network; paying OOP  Physician Information:   Megan Moreno MD  Has the plan of care been signed (Y/N):        [x]  Yes  []  No     Date of Patient follow up with Physician: Patient seen in consultation with Dr. Getachew Sanchez who established the initial/subsequent treatment protocol. 11-10-22: seen by PA      Is this a Progress Report:     [x]  Yes  []  No        If Yes:  Date Range for reporting period:  Beginnin22  Endin22    Progress report will be due (10 Rx or 30 days whichever is less):        Recertification will be due (POC Duration  / 90 days whichever is less): 22         Visit # Insurance Allowable Auth Required   3 Out of network []  Yes []  No        OUTCOME MEASURE DATE SCORE   FOTO NV NV               Latex Allergy:  [x]NO      []YES  Preferred Language for Healthcare:   [x]English       []other:    Pain level:  7-9/10     SUBJECTIVE: 1 week po. Pt states that her knee is really sore. 1.5 hour ride to PT is really uncomfortable.     OBJECTIVE:   10/13/22  Flexibility L R Comment   Hamstring - -10 90/90   Gastroc To Neutral To Neutral     ITB WNL WNL     Quad Mod tightness WNL                  11/15/22          ROM PROM AROM Overpressure Comment     L R L R L R     Flexion 82              Extension -13 cold  -7 best 0                                                    10/13/22  Strength L R Comment   Quad 4 5     Hamstring 4 5     Gastroc NT NT     Hip  flexion 4- 4     Hip abd 4- 4                               Special Test Results/Comment   Homans Neg   Temp Neg      NV  Girth L R   Mid Patella       Suprapatellar       5cm above       15cm above          TEST INITIAL pre-op 9/20   GOAL   SINGLE LEG STANCE TIME L: 2 SEC     R: 16 SEC   >25 SECONDS   STAIR CLIMB TEST 16 SEC       TIMED UP And GO 10 SEC   61-75 y/o: <9sec  66-78 y/o: <10sec  80-81 y/o: <12 sec         Reflexes/Sensation:               [x]Dermatomes/Myotomes intact               []Reflexes equal and normal bilaterally              []Other:     Joint mobility:                [x]Normal                      []Hypo              []Hyper     Palpation: generalized TTP around knee and thigh     Functional Mobility/Transfers: min-modA to lift leg & change positions     Posture: flexed knee     Bandages/Dressings/Incisions: PO bandages removed - local bandage in place with minimal drainage present     Gait: (include devices/WB status) antalgic, flexed knee, stiff-legged, RW - safe mechanics with transfers and gait       RESTRICTIONS/PRECAUTIONS: L TKR    Exercises/Interventions:   Exercise/Equipment Resistance/Repetitions Other comments   Stretching     Hamstring 5 x 30\"    Towel Pull 5 x 30\"    Inclined Calf     Hip Flexion     ITB     Groin     Quad                    SLR     Supine NV    Abduction     Adduction     Prone     SLR+          Isometrics     Quad sets 10 x 10\"    Ball squeeze 10 x 10\"         Patellar Glides     Medial     Superior     Inferior          ROM     Sheet Pulls     Hang Weights 15' Towel prop with vaso   Passive EOB well-leg mob   Active 6' bike    Weight Shift     Ankle Pumps                   CKC     Calf raises 3x10    Wall sits     Step ups     1 leg stand     Squatting     CC TKE 10x10\" into ball    Balance     bridges          PRE     Extension  RANGE:   Flexion  RANGE:        Quantum machines     Leg press      Leg extension     Leg curl          Manual interventions     PROM flex 10'           HEP instruction & Full Post-op instructions provided:   Access Code: [email or web address]  URL: [email or web address]  Date: 11/10/2022  Prepared by: Danny Paz    Exercises  Supine Ankle Pumps - 12 x daily - 7 x weekly - 3 sets - 10 reps  Long Sitting Quad Set - 12 x daily - 7 x weekly - 1 sets - 5 reps - 10 hold  Seated Table Hamstring Stretch - 3 x daily - 7 x weekly - 1 sets - 5 reps - 30 hold  Long Sitting Calf Stretch with Strap - 3 x daily - 7 x weekly - 1 sets - 5 reps - 30 hold  Long Sitting Isometric Hip Adduction and Extension with Ball at Knees - 3 x daily - 7 x weekly - 1 sets - 10 reps - 10 hold  Supine Straight Leg Raises - 3 x daily - 7 x weekly - 3 sets - 5-10 reps  Supine Knee Extension Stretch on Towel Roll - 3-6 x daily - 7 x weekly - 1 reps - 10 min hold  Seated Active Assistive Knee Flexion and Extension (Mirrored) - 3-6 x daily - 7 x weekly - 1 sets - 10 reps - 10 sec hold         Therapeutic Exercise and NMR EXR  [x] (89613) Provided verbal/tactile cueing for activities related to strengthening, flexibility, endurance, ROM for improvements in LE, proximal hip, and core control with self care, mobility, lifting, ambulation.  [] (65684) Provided verbal/tactile cueing for activities related to improving balance, coordination, kinesthetic sense, posture, motor skill, proprioception  to assist with LE, proximal hip, and core control in self care, mobility, lifting, ambulation and eccentric single leg control.      NMR and Therapeutic Activities:    [x] (35634 or 74401) Provided verbal/tactile cueing for activities related to improving balance, coordination, kinesthetic sense, posture, motor skill, proprioception and motor activation to allow for proper function of core, proximal hip and LE with self care and ADLs  [x] (27396) Gait Re-education- Provided training and instruction to the patient for proper LE, core and proximal hip recruitment and positioning and eccentric body weight control with ambulation re-education including up and down stairs     Home Exercise Program:    [x] (45726) Reviewed/Progressed HEP activities related to strengthening, flexibility, endurance, ROM of core, proximal hip and LE for functional self-care, mobility, lifting and ambulation/stair navigation   [] (51710)Reviewed/Progressed HEP activities related to improving balance, coordination, kinesthetic sense, posture, motor skill, proprioception of core, proximal hip and LE for self care, mobility, lifting, and ambulation/stair navigation      Manual Treatments:  PROM / STM / Oscillations-Mobs:  G-I, II, III, IV (PA's, Inf., Post.)  [x] (90468) Provided manual therapy to mobilize LE, proximal hip and/or LS spine soft tissue/joints for the purpose of modulating pain, promoting relaxation,  increasing ROM, reducing/eliminating soft tissue swelling/inflammation/restriction, improving soft tissue extensibility and allowing for proper ROM for normal function with self care, mobility, lifting and ambulation. Modalities: Vaso 15'     Charges:  Timed Code Treatment Minutes: 23'   Total Treatment Minutes: 45'       [] EVAL (LOW) 98092 (typically 20 minutes face-to-face)  [] EVAL (MOD) 31509 (typically 30 minutes face-to-face)  [] EVAL (HIGH) 03978 (typically 45 minutes face-to-face)  [] RE-EVAL   [x] OZ(66004) x 1      [] IONTO  [] NMR (03588) x      [x] VASO  [x] Manual (88902) x      [] Other:  [] TA x       [] Mech Traction (39679)  [] ES(attended) (21736)      [] ES (un) (52055):     GOALS:   Patient stated goal: Return to PLOF without restrictions    [] Progressing: [] Met: [] Not Met: [] Adjusted     Therapist goals for Patient:   Short Term Goals: To be achieved in: 2 weeks  1.  Independent in HEP and progression [] Patient limited by fatigue  [] Patient limited by pain     [] Patient limited by other medical complications  [x] Other: Pt has poor extension but tolerated stretching well, though increased pain with towel propping at end of session. PROM flex improved 15 deg to 82, anterior tightness and discomfort noted at end range. Pt had soreness throughout session. Pt would benefit from regular skilled PT intervention to address deficits and allow a full, safe return to PLOF. PLAN: See eval  [] Continue per plan of care [] Alter current plan (see comments above)  [x] Plan of care initiated [] Hold pending MD visit [] Discharge      Electronically signed by:  Shayna Garay, PT, PT, DPT    Note: If patient does not return for scheduled/ recommended follow up visits, this note will serve as a discharge from care along with most recent update on progress.

## 2022-11-17 ENCOUNTER — HOSPITAL ENCOUNTER (OUTPATIENT)
Dept: PHYSICAL THERAPY | Age: 62
Setting detail: THERAPIES SERIES
Discharge: HOME | End: 2022-11-17
Payer: COMMERCIAL

## 2022-11-17 PROCEDURE — 97110 THERAPEUTIC EXERCISES: CPT | Performed by: PHYSICAL THERAPIST

## 2022-11-17 PROCEDURE — 97140 MANUAL THERAPY 1/> REGIONS: CPT | Performed by: PHYSICAL THERAPIST

## 2022-11-17 NOTE — FLOWSHEET NOTE
The [facility] and [address],  Sports Performance and Rehabilitation, [facility] [address]  793 [facility],5[address] Floor   Carla Leon  Phone: [phone]  Fax: [phone]      Physical Therapy Daily Treatment Note  Date:  2022    Patient Name:  Benitez Raman    :  1960  MRN: [id number]  Restrictions/Precautions:    Medical/Treatment Diagnosis Information:  Diagnosis: M17.12 (ICD-10-CM) - Primary osteoarthritis of left knee  Treatment Diagnosis: M25.562 L Knee Pain, M25.662 L Knee Stiffness, R53.1 Weakness, R26.2 Difficulty with walking  S/P L TKR  DOS: 22  Meds: Percocet (before PT and at night), Tylenol, Aspirin  Insurance/Certification information:  PT Insurance Information: [place] - out of network; paying OOP  Physician Information:   Lisha Patton MD  Has the plan of care been signed (Y/N):        [x]  Yes  []  No     Date of Patient follow up with Physician: Patient seen in consultation with Dr. Mariluz Justin who established the initial/subsequent treatment protocol. 11-10-22: seen by PA  22: Doing well, can switch to Tylenol instead of Percocet if ready      Is this a Progress Report:     [x]  Yes  []  No        If Yes:  Date Range for reporting period:  Beginnin22  Endin22    Progress report will be due (10 Rx or 30 days whichever is less): 76       Recertification will be due (POC Duration  / 90 days whichever is less): 22         Visit # Insurance Allowable Auth Required   4 Out of network []  Yes []  No        OUTCOME MEASURE DATE SCORE   FOTO 22 33               Latex Allergy:  [x]NO      []YES  Preferred Language for Healthcare:   [x]English       []other:    Pain level:  5-7/10     SUBJECTIVE: 1 week po. Pt feels she's doing a bit better. Car ride is still rough. Having some trouble getting comfortable to sleep well and wakes up with position changes.      OBJECTIVE:   10/13/22  Flexibility L R Comment Hamstring -20 -10 90/90   Gastroc To Neutral To Neutral     ITB WNL WNL     Quad Mod tightness WNL                  11/17/22          ROM PROM AROM Overpressure Comment     L R L R L R     Flexion 84 ERMI 138             Extension -10 cold  -5 best 0                                                    10/13/22  Strength L R Comment   Quad 4 5     Hamstring 4 5     Gastroc NT NT     Hip  flexion 4- 4     Hip abd 4- 4                               Special Test Results/Comment   Homans Neg   Temp Neg      NV  Girth L R   Mid Patella       Suprapatellar       5cm above       15cm above          TEST INITIAL pre-op 9/20   GOAL   SINGLE LEG STANCE TIME L: 2 SEC     R: 16 SEC   >25 SECONDS   STAIR CLIMB TEST 16 SEC       TIMED UP And GO 10 SEC   61-77 y/o: <9sec  66-76 y/o: <10sec  80-81 y/o: <12 sec         Reflexes/Sensation:               [x]Dermatomes/Myotomes intact               []Reflexes equal and normal bilaterally              []Other:     Joint mobility:                [x]Normal                      []Hypo              []Hyper     Palpation: generalized TTP around knee and thigh     Functional Mobility/Transfers: min-modA to lift leg & change positions     Posture: flexed knee     Bandages/Dressings/Incisions: PO bandages removed - local bandage in place with minimal drainage present     Gait: (include devices/WB status) antalgic, flexed knee, stiff-legged, RW - safe mechanics with transfers and gait       RESTRICTIONS/PRECAUTIONS: L TKR    Exercises/Interventions:   Exercise/Equipment Resistance/Repetitions Other comments   Stretching     Hamstring 5 x 30\" Towel prop   Towel Pull 5 x 30\" Towel prop   Inclined Calf     Hip Flexion     ITB     Groin     Quad                    SLR     Supine 3 x 5    Abduction     Adduction     Prone     SLR+          Isometrics     Quad sets 10 x 10\"    Ball squeeze 10 x 10\"         Patellar Glides     Medial     Superior     Inferior          ROM     Sheet Pulls     Hang Weights 15' Towel prop with vaso   Passive EOB well-leg mob   Active    Weight Shift     Ankle Pumps    ERMI 5 x 30\"              CKC     Calf raises 3x10    Wall sits     Step ups     1 leg stand     Squatting     CC TKE 10x10\" into ball    Balance     bridges          PRE     Extension 3 x 5 x 3\" SAQ, 0#   Flexion  RANGE:        Quantum machines     Leg press      Leg extension     Leg curl          Manual interventions     PROM flex    Patellar mobility 8'      HEP instruction & Full Post-op instructions provided:   Access Code: [email or web address]  URL: [email or web address]  Date: 11/10/2022  Prepared by: July Cardenas    Exercises  Supine Ankle Pumps - 12 x daily - 7 x weekly - 3 sets - 10 reps  Long Sitting Quad Set - 12 x daily - 7 x weekly - 1 sets - 5 reps - 10 hold  Seated Table Hamstring Stretch - 3 x daily - 7 x weekly - 1 sets - 5 reps - 30 hold  Long Sitting Calf Stretch with Strap - 3 x daily - 7 x weekly - 1 sets - 5 reps - 30 hold  Long Sitting Isometric Hip Adduction and Extension with Ball at Knees - 3 x daily - 7 x weekly - 1 sets - 10 reps - 10 hold  Supine Straight Leg Raises - 3 x daily - 7 x weekly - 3 sets - 5-10 reps  Supine Knee Extension Stretch on Towel Roll - 3-6 x daily - 7 x weekly - 1 reps - 10 min hold  Seated Active Assistive Knee Flexion and Extension (Mirrored) - 3-6 x daily - 7 x weekly - 1 sets - 10 reps - 10 sec hold         Therapeutic Exercise and NMR EXR  [x] (09426) Provided verbal/tactile cueing for activities related to strengthening, flexibility, endurance, ROM for improvements in LE, proximal hip, and core control with self care, mobility, lifting, ambulation.  [] (26797) Provided verbal/tactile cueing for activities related to improving balance, coordination, kinesthetic sense, posture, motor skill, proprioception  to assist with LE, proximal hip, and core control in self care, mobility, lifting, ambulation and eccentric single leg control.      NMR and Therapeutic expected and requires additional follow up with physician  [] Other    Prognosis for POC: [x] Good [] Fair  [] Poor      Patient requires continued skilled intervention: [x] Yes  [] No    Treatment/Activity Tolerance:  [x] Patient able to complete treatment  [] Patient limited by fatigue  [] Patient limited by pain     [] Patient limited by other medical complications  [x] Other: Pt had improved tolerance to extended position. Initially needed modA with SLR but ind after first set. ROM progressing nicely into ext and about the same into flexion. Encouraged pt to work on ROM often. Improved gait mechanics today with less antalgia and increased weight bearing. Pt has been compliant with HEP to date. Continues to have substantial deficits compared to normal and would continue to benefit from skilled PT intervention. PLAN: See eval  [] Continue per plan of care [] Alter current plan (see comments above)  [x] Plan of care initiated [] Hold pending MD visit [] Discharge      Electronically signed by:  Laxmi Xie, PT, DPT    Note: If patient does not return for scheduled/ recommended follow up visits, this note will serve as a discharge from care along with most recent update on progress.

## 2022-11-22 ENCOUNTER — HOSPITAL ENCOUNTER (OUTPATIENT)
Dept: PHYSICAL THERAPY | Age: 62
Setting detail: THERAPIES SERIES
Discharge: HOME | End: 2022-11-22
Payer: COMMERCIAL

## 2022-11-22 PROCEDURE — 97110 THERAPEUTIC EXERCISES: CPT | Performed by: PHYSICAL THERAPIST

## 2022-11-22 PROCEDURE — 97530 THERAPEUTIC ACTIVITIES: CPT | Performed by: PHYSICAL THERAPIST

## 2022-11-22 PROCEDURE — 97112 NEUROMUSCULAR REEDUCATION: CPT | Performed by: PHYSICAL THERAPIST

## 2022-11-22 NOTE — FLOWSHEET NOTE
The [facility] and [address],  Sports Performance and Rehabilitation, [facility] [address]199 [address]  793 [facility],5[address] Floor   Carla Leon  Phone: [phone]  Fax: [phone]      Physical Therapy Daily Treatment Note  Date:  2022    Patient Name:  Kevin Resendez    :  1960  MRN: [id number]  Restrictions/Precautions:    Medical/Treatment Diagnosis Information:  Diagnosis: M17.12 (ICD-10-CM) - Primary osteoarthritis of left knee  Treatment Diagnosis: M25.562 L Knee Pain, M25.662 L Knee Stiffness, R53.1 Weakness, R26.2 Difficulty with walking  S/P L TKR  DOS: 22  Meds:  Tylenol, Aspirin  Insurance/Certification information:  PT Insurance Information: [place] - out of network; paying OOP  Physician Information:   Megan Moreno MD  Has the plan of care been signed (Y/N):        [x]  Yes  []  No     Date of Patient follow up with Physician: Patient seen in consultation with Dr. Getachew Sanchez who established the initial/subsequent treatment protocol. 11-10-22: seen by PA  22: Doing well, can switch to Tylenol instead of Percocet if ready      Is this a Progress Report:     [x]  Yes  []  No        If Yes:  Date Range for reporting period:  Beginnin22  Endin22    Progress report will be due (10 Rx or 30 days whichever is less): 81       Recertification will be due (POC Duration  / 90 days whichever is less): 22         Visit # Insurance Allowable Auth Required   5 Out of network []  Yes []  No        OUTCOME MEASURE DATE SCORE   FOTO 22 33               Latex Allergy:  [x]NO      []YES  Preferred Language for Healthcare:   [x]English       []other:    Pain level:  1-4/10     SUBJECTIVE: 2 week po. Pt feels she's progressing steadily. Off all rx pain meds. Just taking tylenol PRN. Having some trouble sleeping. Can't sleep in normal position and gets stiff quickly.     OBJECTIVE:   10/13/22  Flexibility L R Comment   Hamstring -20 -10 90/90   Gastroc To Neutral To Neutral     ITB WNL WNL     Quad Mod tightness WNL                  11/22/22          ROM PROM AROM Overpressure Comment     L R L R L R     Flexion 90 ERMI 138             Extension -7 cold  -3 best 0                                                    10/13/22  Strength L R Comment   Quad 4 5     Hamstring 4 5     Gastroc NT NT     Hip  flexion 4- 4     Hip abd 4- 4                               Special Test Results/Comment   Homans Neg   Temp Neg      NV  Girth L R   Mid Patella       Suprapatellar       5cm above       15cm above          TEST INITIAL pre-op 9/20   GOAL   SINGLE LEG STANCE TIME L: 2 SEC     R: 16 SEC   >25 SECONDS   STAIR CLIMB TEST 16 SEC       TIMED UP And GO 10 SEC   61-77 y/o: <9sec  66-76 y/o: <10sec  80-81 y/o: <12 sec         Reflexes/Sensation:               [x]Dermatomes/Myotomes intact               []Reflexes equal and normal bilaterally              []Other:     Joint mobility:                [x]Normal                      []Hypo              []Hyper     Palpation: generalized TTP around knee and thigh     Functional Mobility/Transfers: min-modA to lift leg & change positions     Posture: flexed knee     Bandages/Dressings/Incisions: PO bandages removed - local bandage in place with minimal drainage present     Gait: (include devices/WB status) antalgic, flexed knee, stiff-legged, RW - safe mechanics with transfers and gait       RESTRICTIONS/PRECAUTIONS: L TKR    Exercises/Interventions:   Exercise/Equipment Resistance/Repetitions Other comments   Stretching     Hamstring 5 x 30\" Towel prop   Towel Pull 5 x 30\" Towel prop   Inclined Calf     Hip Flexion     ITB     Groin     Quad                    SLR     Supine 3 x 5    Abduction 3 x 5    Adduction     Prone     SLR+          Isometrics     Quad sets 10 x 10\"    Ball squeeze 10 x 10\"         Patellar Glides 5'    Medial     Superior     Inferior          ROM     Sheet Pulls     Hang Weights 15' Towel prop with vaso   Passive EOB well-leg mob   Active 15x Ext/Flex after patellar mobs   Weight Shift     Ankle Pumps    ERMI 5 x 30\"              CKC     Calf raises 3x10    Wall sits     Step ups     1 leg stand     Squatting     CC TKE 10x10\" into ball    Balance     bridges          PRE     Extension 3 x 5 x 3\" SAQ, 0#   Flexion 3 x 10 Standing, 0#        Quantum machines     Leg press      Leg extension     Leg curl          Manual interventions     PROM flex    Patellar mobility 8'      HEP instruction & Full Post-op instructions provided:   Access Code: [email or web address]  URL: [email or web address]  Date: 11/22/2022  Prepared by: Sherrell Hams    Exercises  Sitting Heel Slide with Towel - 3-6 x daily - 7 x weekly - 10 reps - 30 sec hold  Supine Knee Extension Stretch on Towel Roll - 3-6 x daily - 7 x weekly - 1 reps - 10 min hold  Seated Table Hamstring Stretch - 3 x daily - 7 x weekly - 1 sets - 5 reps - 30 hold  Long Sitting Calf Stretch with Strap - 3 x daily - 7 x weekly - 1 sets - 5 reps - 30 hold  Long Sitting Isometric Hip Adduction and Extension with Ball at Knees - 3 x daily - 7 x weekly - 1 sets - 10 reps - 10 hold  Supine Straight Leg Raises - 3 x daily - 7 x weekly - 3 sets - 5-10 reps  Supine Knee Extension Strengthening - 1 x daily - 7 x weekly - 3 sets - 10 reps  Sidelying Hip Abduction (Mirrored) - 1 x daily - 7 x weekly - 3 sets - 10 reps  Heel Raises with Counter Support - 1 x daily - 7 x weekly - 3 sets - 10 reps  Standing Knee Flexion (Mirrored) - 1 x daily - 7 x weekly - 3 sets - 10 reps       Therapeutic Exercise and NMR EXR  [x] (36659) Provided verbal/tactile cueing for activities related to strengthening, flexibility, endurance, ROM for improvements in LE, proximal hip, and core control with self care, mobility, lifting, ambulation.  [] (93140) Provided verbal/tactile cueing for activities related to improving balance, coordination, kinesthetic sense, posture, motor skill, proprioception  to assist with LE, proximal hip, and core control in self care, mobility, lifting, ambulation and eccentric single leg control. NMR and Therapeutic Activities:    [x] (74833 or 22328) Provided verbal/tactile cueing for activities related to improving balance, coordination, kinesthetic sense, posture, motor skill, proprioception and motor activation to allow for proper function of core, proximal hip and LE with self care and ADLs  [x] (43709) Gait Re-education- Provided training and instruction to the patient for proper LE, core and proximal hip recruitment and positioning and eccentric body weight control with ambulation re-education including up and down stairs     Home Exercise Program:    [x] (24064) Reviewed/Progressed HEP activities related to strengthening, flexibility, endurance, ROM of core, proximal hip and LE for functional self-care, mobility, lifting and ambulation/stair navigation   [] (27898)Reviewed/Progressed HEP activities related to improving balance, coordination, kinesthetic sense, posture, motor skill, proprioception of core, proximal hip and LE for self care, mobility, lifting, and ambulation/stair navigation      Manual Treatments:  PROM / STM / Oscillations-Mobs:  G-I, II, III, IV (PA's, Inf., Post.)  [x] (18647) Provided manual therapy to mobilize LE, proximal hip and/or LS spine soft tissue/joints for the purpose of modulating pain, promoting relaxation,  increasing ROM, reducing/eliminating soft tissue swelling/inflammation/restriction, improving soft tissue extensibility and allowing for proper ROM for normal function with self care, mobility, lifting and ambulation.      Modalities:  Pt declined    Charges:  Timed Code Treatment Minutes: 39'   Total Treatment Minutes: 39'   10:52 - 11:45    [] EVAL (LOW) 60959 (typically 20 minutes face-to-face)  [] EVAL (MOD) 11591 (typically 30 minutes face-to-face)  [] EVAL (HIGH) 55529 (typically 45 minutes face-to-face)  [] RE-EVAL   [x] KR(78312) x 1      [] IONTO  [x] NMR (50147) x  1    [] VASO  [] Manual (41153) x      [] Other:  [x] TA x   1    [] Mech Traction (00205)  [] ES(attended) (57565)      [] ES (un) (50262):     GOALS:   Patient stated goal: Return to PLOF without restrictions    [] Progressing: [] Met: [] Not Met: [] Adjusted     Therapist goals for Patient:   Short Term Goals: To be achieved in: 2 weeks  1. Independent in HEP and progression per patient tolerance, in order to prevent re-injury. [] Progressing: [] Met: [] Not Met: [] Adjusted   2. Patient will have a decrease in pain to facilitate improvement in movement, function, and ADLs as indicated by Functional Deficits. [] Progressing: [] Met: [] Not Met: [] Adjusted     Long Term Goals: To be achieved in: 12 weeks PO  1. Disability index score of 50+ on FOTO to assist with reaching prior level of function. [] Progressing: [] Met: [] Not Met: [] Adjusted  2. Patient will demonstrate increased AROM to 0-130 deg to allow for proper joint functioning as indicated by patients Functional Deficits. [] Progressing: [] Met: [] Not Met: [] Adjusted  3. Patient will demonstrate an increase in Strength to good proximal hip strength and control, within 5lb HHD in LE to allow for proper functional mobility as indicated by patients Functional Deficits. [] Progressing: [] Met: [] Not Met: [] Adjusted  4. Patient will return to household functional activities without increased symptoms or restriction. [] Progressing: [] Met: [] Not Met: [] Adjusted  5. Pt will be able to ascend a flight of stairs reciprocally without restrictions. (patient specific functional goal)    [] Progressing: [] Met: [] Not Met: [] Adjusted          Overall Progression Towards Functional goals/ Treatment Progress Update:  [] Patient is progressing as expected towards functional goals listed. [] Progression is slowed due to complexities/Impairments listed.   [] Progression has been slowed due to co-morbidities. [x] Plan just implemented, too soon to assess goals progression <30days   [] Goals require adjustment due to lack of progress  [] Patient is not progressing as expected and requires additional follow up with physician  [] Other    Prognosis for POC: [x] Good [] Fair  [] Poor      Patient requires continued skilled intervention: [x] Yes  [] No    Treatment/Activity Tolerance:  [x] Patient able to complete treatment  [] Patient limited by fatigue  [] Patient limited by pain     [] Patient limited by other medical complications  [x] Other: Pt progressing very well. Hit initial ROM goal. Good patellar mobility. Able to complete SLR independently. Improved but not normalized gait needing assistance from RW for best gait. Updated HEP provided today with good pt understanding. Will plan to transfer to PT facility closer to home in 2 weeks. PLAN: See eval  [] Continue per plan of care [] Alter current plan (see comments above)  [x] Plan of care initiated [] Hold pending MD visit [] Discharge      Electronically signed by:  Soham Carbajal, PT, DPT    Note: If patient does not return for scheduled/ recommended follow up visits, this note will serve as a discharge from care along with most recent update on progress.

## 2022-11-29 ENCOUNTER — HOSPITAL ENCOUNTER (OUTPATIENT)
Dept: PHYSICAL THERAPY | Age: 62
Setting detail: THERAPIES SERIES
Discharge: HOME | End: 2022-11-29
Payer: COMMERCIAL

## 2022-11-29 PROCEDURE — 97530 THERAPEUTIC ACTIVITIES: CPT | Performed by: PHYSICAL THERAPIST

## 2022-11-29 PROCEDURE — 97140 MANUAL THERAPY 1/> REGIONS: CPT | Performed by: PHYSICAL THERAPIST

## 2022-11-29 PROCEDURE — 97110 THERAPEUTIC EXERCISES: CPT | Performed by: PHYSICAL THERAPIST

## 2022-11-29 NOTE — FLOWSHEET NOTE
The [facility] and [address],  Sports Performance and Rehabilitation, [facility] [address]  [address] [facility],5[address] Floor   Carla Leon  Phone: [phone]  Fax: [phone]      Physical Therapy Daily Treatment Note  Date:  2022    Patient Name:  Bhargav Morgan    :  1960  MRN: [id number]  Restrictions/Precautions:    Medical/Treatment Diagnosis Information:  Diagnosis: M17.12 (ICD-10-CM) - Primary osteoarthritis of left knee  Treatment Diagnosis: M25.562 L Knee Pain, M25.662 L Knee Stiffness, R53.1 Weakness, R26.2 Difficulty with walking  S/P L TKR  DOS: 22  Meds:  Tylenol, Aspirin  Insurance/Certification information:  PT Insurance Information: [place] - out of network; paying OOP  Physician Information:   Larissa Humphrey MD  Has the plan of care been signed (Y/N):        [x]  Yes  []  No     Date of Patient follow up with Physician: Patient seen in consultation with Dr. Ranjana Mckeon who established the initial/subsequent treatment protocol. 11-10-22: seen by PA  22: Doing well, can switch to Tylenol instead of Percocet if ready      Is this a Progress Report:     [x]  Yes  []  No        If Yes:  Date Range for reporting period:  Beginnin22  Endin22    Progress report will be due (10 Rx or 30 days whichever is less):        Recertification will be due (POC Duration  / 90 days whichever is less): 22         Visit # Insurance Allowable Auth Required   6 Out of network []  Yes []  No        OUTCOME MEASURE DATE SCORE   FOTO 22 33               Latex Allergy:  [x]NO      []YES  Preferred Language for Healthcare:   [x]English       []other:    Pain level:  1-10     SUBJECTIVE: 3 week po. Pt feeling some increased stiffness/soreness with some extra time on her feet over the holidays. No big concerns at this time.      OBJECTIVE:   10/13/22  Flexibility L R Comment   Hamstring -20 -10 90/90   Gastroc To Neutral To Neutral ITB WNL WNL     Quad Mod tightness WNL                  11/29/22          ROM PROM AROM Overpressure Comment     L R L R L R     Flexion 90 ERMI 138             Extension -5 cold  -2 best 0                                                    10/13/22  Strength L R Comment   Quad 4 5     Hamstring 4 5     Gastroc NT NT     Hip  flexion 4- 4     Hip abd 4- 4                               Special Test Results/Comment   Homans Neg   Temp Neg      NV  Girth L R   Mid Patella       Suprapatellar       5cm above       15cm above          TEST INITIAL pre-op 9/20   GOAL   SINGLE LEG STANCE TIME L: 2 SEC     R: 16 SEC   >25 SECONDS   STAIR CLIMB TEST 16 SEC       TIMED UP And GO 10 SEC   61-75 y/o: <9sec  66-78 y/o: <10sec  80-81 y/o: <12 sec         Reflexes/Sensation:               [x]Dermatomes/Myotomes intact               []Reflexes equal and normal bilaterally              []Other:     Joint mobility:                [x]Normal                      []Hypo              []Hyper     Palpation: generalized TTP around knee and thigh     Functional Mobility/Transfers: min-modA to lift leg & change positions     Posture: flexed knee     Bandages/Dressings/Incisions: PO bandages removed - local bandage in place with minimal drainage present     Gait: (include devices/WB status) antalgic, flexed knee, stiff-legged, RW - safe mechanics with transfers and gait       RESTRICTIONS/PRECAUTIONS: L TKR    Exercises/Interventions:   Exercise/Equipment Resistance/Repetitions Other comments   Stretching     Hamstring 5 x 30\" Towel prop   Towel Pull 5 x 30\" Towel prop   Inclined Calf     Hip Flexion     ITB     Groin     Quad                    SLR     Supine 3 x 10    Abduction 3 x 10    Adduction     Prone     SLR+          Isometrics     Quad sets 10 x 10\"    Ball squeeze 10 x 10\"         Patellar Glides 5'    Medial     Superior     Inferior          ROM     Sheet Pulls     Hang Weights 15' Towel prop with vaso   Passive EOB well-leg mob   Active 15x Ext/Flex after patellar mobs   Weight Shift     Ankle Pumps    Rec bike 5' ROM; seat 6 (handle)   ERMI 5 x 30\"              CKC     Calf raises 3x10    Wall sits     Step ups     1 leg stand     Squatting     CC TKE 10x10\" into ball    Balance     bridges          PRE     Extension 3 x 5 x 3\" SAQ, 0#   Flexion 3 x 10 Standing, 0#        Quantum machines     Leg press      Leg extension     Leg curl          Manual interventions     PROM flex    Patellar mobility & STM 8'      HEP instruction & Full Post-op instructions provided:   Access Code: [email or web address]  URL: [email or web address]  Date: 11/22/2022  Prepared by: Kye Cash    Exercises  Sitting Heel Slide with Towel - 3-6 x daily - 7 x weekly - 10 reps - 30 sec hold  Supine Knee Extension Stretch on Towel Roll - 3-6 x daily - 7 x weekly - 1 reps - 10 min hold  Seated Table Hamstring Stretch - 3 x daily - 7 x weekly - 1 sets - 5 reps - 30 hold  Long Sitting Calf Stretch with Strap - 3 x daily - 7 x weekly - 1 sets - 5 reps - 30 hold  Long Sitting Isometric Hip Adduction and Extension with Ball at Knees - 3 x daily - 7 x weekly - 1 sets - 10 reps - 10 hold  Supine Straight Leg Raises - 3 x daily - 7 x weekly - 3 sets - 5-10 reps  Supine Knee Extension Strengthening - 1 x daily - 7 x weekly - 3 sets - 10 reps  Sidelying Hip Abduction (Mirrored) - 1 x daily - 7 x weekly - 3 sets - 10 reps  Heel Raises with Counter Support - 1 x daily - 7 x weekly - 3 sets - 10 reps  Standing Knee Flexion (Mirrored) - 1 x daily - 7 x weekly - 3 sets - 10 reps       Therapeutic Exercise and NMR EXR  [x] (72101) Provided verbal/tactile cueing for activities related to strengthening, flexibility, endurance, ROM for improvements in LE, proximal hip, and core control with self care, mobility, lifting, ambulation.  [] (17456) Provided verbal/tactile cueing for activities related to improving balance, coordination, kinesthetic sense, posture, motor skill, proprioception to assist with LE, proximal hip, and core control in self care, mobility, lifting, ambulation and eccentric single leg control. NMR and Therapeutic Activities:    [x] (80048 or 34580) Provided verbal/tactile cueing for activities related to improving balance, coordination, kinesthetic sense, posture, motor skill, proprioception and motor activation to allow for proper function of core, proximal hip and LE with self care and ADLs  [x] (89484) Gait Re-education- Provided training and instruction to the patient for proper LE, core and proximal hip recruitment and positioning and eccentric body weight control with ambulation re-education including up and down stairs     Home Exercise Program:    [x] (20116) Reviewed/Progressed HEP activities related to strengthening, flexibility, endurance, ROM of core, proximal hip and LE for functional self-care, mobility, lifting and ambulation/stair navigation   [] (56986)Reviewed/Progressed HEP activities related to improving balance, coordination, kinesthetic sense, posture, motor skill, proprioception of core, proximal hip and LE for self care, mobility, lifting, and ambulation/stair navigation      Manual Treatments:  PROM / STM / Oscillations-Mobs:  G-I, II, III, IV (PA's, Inf., Post.)  [x] (68359) Provided manual therapy to mobilize LE, proximal hip and/or LS spine soft tissue/joints for the purpose of modulating pain, promoting relaxation,  increasing ROM, reducing/eliminating soft tissue swelling/inflammation/restriction, improving soft tissue extensibility and allowing for proper ROM for normal function with self care, mobility, lifting and ambulation.      Modalities:  Pt declined    Charges:  Timed Code Treatment Minutes: 39'   Total Treatment Minutes: 39'   10:55 - 11:48    [] EVAL (LOW) 62108 (typically 20 minutes face-to-face)  [] EVAL (MOD) 25256 (typically 30 minutes face-to-face)  [] EVAL (HIGH) 32707 (typically 45 minutes face-to-face)  [] RE-EVAL   [x] HQ(82646) x 1      [] IONTO  [] NMR (26705) x      [] VASO  [x] Manual (83896) x  1    [] Other:  [x] TA x   1    [] Mech Traction (50540)  [] ES(attended) (90832)      [] ES (un) (47171):     GOALS:   Patient stated goal: Return to PLOF without restrictions    [] Progressing: [] Met: [] Not Met: [] Adjusted     Therapist goals for Patient:   Short Term Goals: To be achieved in: 2 weeks  1. Independent in HEP and progression per patient tolerance, in order to prevent re-injury. [] Progressing: [] Met: [] Not Met: [] Adjusted   2. Patient will have a decrease in pain to facilitate improvement in movement, function, and ADLs as indicated by Functional Deficits. [] Progressing: [] Met: [] Not Met: [] Adjusted     Long Term Goals: To be achieved in: 12 weeks PO  1. Disability index score of 50+ on FOTO to assist with reaching prior level of function. [] Progressing: [] Met: [] Not Met: [] Adjusted  2. Patient will demonstrate increased AROM to 0-130 deg to allow for proper joint functioning as indicated by patients Functional Deficits. [] Progressing: [] Met: [] Not Met: [] Adjusted  3. Patient will demonstrate an increase in Strength to good proximal hip strength and control, within 5lb HHD in LE to allow for proper functional mobility as indicated by patients Functional Deficits. [] Progressing: [] Met: [] Not Met: [] Adjusted  4. Patient will return to household functional activities without increased symptoms or restriction. [] Progressing: [] Met: [] Not Met: [] Adjusted  5. Pt will be able to ascend a flight of stairs reciprocally without restrictions. (patient specific functional goal)    [] Progressing: [] Met: [] Not Met: [] Adjusted          Overall Progression Towards Functional goals/ Treatment Progress Update:  [] Patient is progressing as expected towards functional goals listed. [] Progression is slowed due to complexities/Impairments listed.   [] Progression has been slowed due to co-morbidities. [x] Plan just implemented, too soon to assess goals progression <30days   [] Goals require adjustment due to lack of progress  [] Patient is not progressing as expected and requires additional follow up with physician  [] Other    Prognosis for POC: [x] Good [] Fair  [] Poor      Patient requires continued skilled intervention: [x] Yes  [] No    Treatment/Activity Tolerance:  [x] Patient able to complete treatment  [] Patient limited by fatigue  [] Patient limited by pain     [] Patient limited by other medical complications  [x] Other: Pt progressing very well. Continuing to improve ROM steadily. Better quad contraction with QS and SLR. Tolerated increased reps of SLR supine and S/L. Pt demonstrating some stiffness with gait but improved compared to last week. Pt would be appropriate to progress to facility closer to home next week as her initial progress is ahead of schedule. OOT packet provided today. PLAN: See eval  [] Continue per plan of care [] Alter current plan (see comments above)  [x] Plan of care initiated [] Hold pending MD visit [] Discharge      Electronically signed by:  Jeana Estrada, PT, DPT    Note: If patient does not return for scheduled/ recommended follow up visits, this note will serve as a discharge from care along with most recent update on progress.

## 2022-12-01 ENCOUNTER — HOSPITAL ENCOUNTER (OUTPATIENT)
Dept: PHYSICAL THERAPY | Age: 62
Setting detail: THERAPIES SERIES
Discharge: HOME | End: 2022-12-01
Payer: COMMERCIAL

## 2022-12-01 ENCOUNTER — OFFICE VISIT (OUTPATIENT)
Dept: ORTHOPEDIC SURGERY | Age: 62
End: 2022-12-01

## 2022-12-01 VITALS — WEIGHT: 209 LBS | HEIGHT: 69 IN | BODY MASS INDEX: 30.96 KG/M2

## 2022-12-01 DIAGNOSIS — Z96.652 STATUS POST TOTAL LEFT KNEE REPLACEMENT: ICD-10-CM

## 2022-12-01 DIAGNOSIS — M25.562 ACUTE PAIN OF LEFT KNEE: Primary | ICD-10-CM

## 2022-12-01 PROCEDURE — 97140 MANUAL THERAPY 1/> REGIONS: CPT | Performed by: PHYSICAL THERAPIST

## 2022-12-01 PROCEDURE — 97530 THERAPEUTIC ACTIVITIES: CPT | Performed by: PHYSICAL THERAPIST

## 2022-12-01 PROCEDURE — 97110 THERAPEUTIC EXERCISES: CPT | Performed by: PHYSICAL THERAPIST

## 2022-12-01 NOTE — FLOWSHEET NOTE
The [facility] and [address],  Sports Performance and Rehabilitation, [facility] [address]  [address] [facility],5Th Floor   Carla Leon  Phone: [phone]  Fax: [phone]      Physical Therapy Daily Treatment Note  Date:  2022    Patient Name:  Bhargav Morgan    :  1960  MRN: [id number]  Restrictions/Precautions:    Medical/Treatment Diagnosis Information:  Diagnosis: M17.12 (ICD-10-CM) - Primary osteoarthritis of left knee  Treatment Diagnosis: M25.562 L Knee Pain, M25.662 L Knee Stiffness, R53.1 Weakness, R26.2 Difficulty with walking  S/P L TKR  DOS: 22  Meds:  Tylenol, Aspirin  Insurance/Certification information:  PT Insurance Information: [place] - out of network; paying OOP  Physician Information:   Larissa Humphrey MD  Has the plan of care been signed (Y/N):        [x]  Yes  []  No     Date of Patient follow up with Physician: Patient seen in consultation with Dr. Ranjana Mckeon who established the initial/subsequent treatment protocol. 11-10-22: seen by PA  22: Doing well, can switch to Tylenol instead of Percocet if ready      Is this a Progress Report:     [x]  Yes  []  No        If Yes:  Date Range for reporting period:  Beginnin22  Endin22    Progress report will be due (10 Rx or 30 days whichever is less):        Recertification will be due (POC Duration  / 90 days whichever is less): 22         Visit # Insurance Allowable Auth Required   7 Out of network []  Yes []  No        OUTCOME MEASURE DATE SCORE   FOTO 22 33               Latex Allergy:  [x]NO      []YES  Preferred Language for Healthcare:   [x]English       []other:    Pain level:  1-410     SUBJECTIVE: 3.5 week po. Pt feeling similar to the other day. Feels she's doing pretty well.      OBJECTIVE:   10/13/22  Flexibility L R Comment   Hamstring -20 -10 90/90   Gastroc To Neutral To Neutral     ITB WNL WNL     Quad Mod tightness WNL 12/1/22          ROM PROM AROM Overpressure Comment     L R L R L R     Flexion 104 ERMI 138             Extension -5 cold  -2 best 0                                                    10/13/22  Strength L R Comment   Quad 4 5     Hamstring 4 5     Gastroc NT NT     Hip  flexion 4- 4     Hip abd 4- 4                               Special Test Results/Comment   Homans Neg   Temp Neg      NV  Girth L R   Mid Patella       Suprapatellar       5cm above       15cm above          TEST INITIAL pre-op 9/20   GOAL   SINGLE LEG STANCE TIME L: 2 SEC     R: 16 SEC   >25 SECONDS   STAIR CLIMB TEST 16 SEC       TIMED UP And GO 10 SEC   61-77 y/o: <9sec  66-78 y/o: <10sec  80-79 y/o: <12 sec         Reflexes/Sensation:               [x]Dermatomes/Myotomes intact               []Reflexes equal and normal bilaterally              []Other:     Joint mobility:                [x]Normal                      []Hypo              []Hyper     Palpation: generalized TTP around knee and thigh     Functional Mobility/Transfers: min-modA to lift leg & change positions     Posture: flexed knee     Bandages/Dressings/Incisions: PO bandages removed - local bandage in place with minimal drainage present     Gait: (include devices/WB status) antalgic, flexed knee, stiff-legged, RW - safe mechanics with transfers and gait       RESTRICTIONS/PRECAUTIONS: L TKR    Exercises/Interventions:   Exercise/Equipment Resistance/Repetitions Other comments   Stretching     Hamstring 5 x 30\" Towel prop   Towel Pull 5 x 30\" Towel prop   Inclined Calf     Hip Flexion     ITB     Groin     Quad                    SLR     Supine 3 x 10    Abduction 3 x 10    Adduction     Prone     SLR+          Isometrics     Quad sets 10 x 10\"    Ball squeeze 10 x 10\"         Patellar Glides 5'    Medial     Superior     Inferior          ROM     Sheet Pulls     Hang Weights 15' Towel prop with vaso   Passive EOB well-leg mob   Active 15x Ext/Flex after patellar mobs   Weight Shift     Ankle Pumps    Rec bike 5' ROM; seat 6 (handle)   ERMI 5 x 30\"              CKC     Calf raises 3x10    Wall sits     Step ups     1 leg stand     Squatting     CC TKE 10x10\" into ball    Balance     bridges          PRE     Extension 3 x 5 x 3\" SAQ, 0#   Flexion 3 x 10 Standing, 0#        Quantum machines     Leg press      Leg extension     Leg curl          Manual interventions     PROM flex    Patellar mobility & STM 8'      HEP instruction & Full Post-op instructions provided:   Access Code: [email or web address]  URL: [email or web address]/  Date: 11/22/2022 (reviewed 12/1/22)  Prepared by: Kye Cash    Exercises  Sitting Heel Slide with Towel - 3-6 x daily - 7 x weekly - 10 reps - 30 sec hold  Supine Knee Extension Stretch on Towel Roll - 3-6 x daily - 7 x weekly - 1 reps - 10 min hold  Seated Table Hamstring Stretch - 3 x daily - 7 x weekly - 1 sets - 5 reps - 30 hold  Long Sitting Calf Stretch with Strap - 3 x daily - 7 x weekly - 1 sets - 5 reps - 30 hold  Long Sitting Isometric Hip Adduction and Extension with Ball at Knees - 3 x daily - 7 x weekly - 1 sets - 10 reps - 10 hold  Supine Straight Leg Raises - 3 x daily - 7 x weekly - 3 sets - 5-10 reps  Supine Knee Extension Strengthening - 1 x daily - 7 x weekly - 3 sets - 10 reps  Sidelying Hip Abduction (Mirrored) - 1 x daily - 7 x weekly - 3 sets - 10 reps  Heel Raises with Counter Support - 1 x daily - 7 x weekly - 3 sets - 10 reps  Standing Knee Flexion (Mirrored) - 1 x daily - 7 x weekly - 3 sets - 10 reps       Therapeutic Exercise and NMR EXR  [x] (53366) Provided verbal/tactile cueing for activities related to strengthening, flexibility, endurance, ROM for improvements in LE, proximal hip, and core control with self care, mobility, lifting, ambulation.  [] (47462) Provided verbal/tactile cueing for activities related to improving balance, coordination, kinesthetic sense, posture, motor skill, proprioception  to assist with LE, proximal hip, and core control in self care, mobility, lifting, ambulation and eccentric single leg control. NMR and Therapeutic Activities:    [x] (82913 or 20102) Provided verbal/tactile cueing for activities related to improving balance, coordination, kinesthetic sense, posture, motor skill, proprioception and motor activation to allow for proper function of core, proximal hip and LE with self care and ADLs  [x] (74552) Gait Re-education- Provided training and instruction to the patient for proper LE, core and proximal hip recruitment and positioning and eccentric body weight control with ambulation re-education including up and down stairs     Home Exercise Program:    [x] (75204) Reviewed/Progressed HEP activities related to strengthening, flexibility, endurance, ROM of core, proximal hip and LE for functional self-care, mobility, lifting and ambulation/stair navigation   [] (92400)Reviewed/Progressed HEP activities related to improving balance, coordination, kinesthetic sense, posture, motor skill, proprioception of core, proximal hip and LE for self care, mobility, lifting, and ambulation/stair navigation      Manual Treatments:  PROM / STM / Oscillations-Mobs:  G-I, II, III, IV (PA's, Inf., Post.)  [x] (64457) Provided manual therapy to mobilize LE, proximal hip and/or LS spine soft tissue/joints for the purpose of modulating pain, promoting relaxation,  increasing ROM, reducing/eliminating soft tissue swelling/inflammation/restriction, improving soft tissue extensibility and allowing for proper ROM for normal function with self care, mobility, lifting and ambulation.      Modalities:  Pt declined    Charges:  Timed Code Treatment Minutes: 39'   Total Treatment Minutes: 39'   10:55 - 11:48    [] EVAL (LOW) 11490 (typically 20 minutes face-to-face)  [] EVAL (MOD) 08621 (typically 30 minutes face-to-face)  [] EVAL (HIGH) 62115 (typically 45 minutes face-to-face)  [] RE-EVAL   [x] FAUSTINO(14803) x 1      [] IONTO  [] NMR (57197) x      [] VASO  [x] Manual (06439) x  1    [] Other:  [x] TA x   1    [] Mech Traction (19551)  [] ES(attended) (17807)      [] ES (un) (66372):     GOALS:   Patient stated goal: Return to PLOF without restrictions    [] Progressing: [] Met: [] Not Met: [] Adjusted     Therapist goals for Patient:   Short Term Goals: To be achieved in: 2 weeks  1. Independent in HEP and progression per patient tolerance, in order to prevent re-injury. [] Progressing: [] Met: [] Not Met: [] Adjusted   2. Patient will have a decrease in pain to facilitate improvement in movement, function, and ADLs as indicated by Functional Deficits. [] Progressing: [] Met: [] Not Met: [] Adjusted     Long Term Goals: To be achieved in: 12 weeks PO  1. Disability index score of 50+ on FOTO to assist with reaching prior level of function. [] Progressing: [] Met: [] Not Met: [] Adjusted  2. Patient will demonstrate increased AROM to 0-130 deg to allow for proper joint functioning as indicated by patients Functional Deficits. [] Progressing: [] Met: [] Not Met: [] Adjusted  3. Patient will demonstrate an increase in Strength to good proximal hip strength and control, within 5lb HHD in LE to allow for proper functional mobility as indicated by patients Functional Deficits. [] Progressing: [] Met: [] Not Met: [] Adjusted  4. Patient will return to household functional activities without increased symptoms or restriction. [] Progressing: [] Met: [] Not Met: [] Adjusted  5. Pt will be able to ascend a flight of stairs reciprocally without restrictions. (patient specific functional goal)    [] Progressing: [] Met: [] Not Met: [] Adjusted          Overall Progression Towards Functional goals/ Treatment Progress Update:  [] Patient is progressing as expected towards functional goals listed. [] Progression is slowed due to complexities/Impairments listed. [] Progression has been slowed due to co-morbidities.   [x] Plan just implemented, too soon to assess goals progression <30days   [] Goals require adjustment due to lack of progress  [] Patient is not progressing as expected and requires additional follow up with physician  [] Other    Prognosis for POC: [x] Good [] Fair  [] Poor      Patient requires continued skilled intervention: [x] Yes  [] No    Treatment/Activity Tolerance:  [x] Patient able to complete treatment  [] Patient limited by fatigue  [] Patient limited by pain     [] Patient limited by other medical complications  [x] Other: Pt progressing very well. Continuing to improve ROM. Improved but not normalized gait. Improved tolerance to strengthening program today. Pt would be appropriate to transfer to PT closer to home next week. Updated HEP. PLAN: See eval  [] Continue per plan of care [] Alter current plan (see comments above)  [x] Plan of care initiated [] Hold pending MD visit [] Discharge      Electronically signed by:  Willam Fields, PT, DPT    Note: If patient does not return for scheduled/ recommended follow up visits, this note will serve as a discharge from care along with most recent update on progress.

## 2022-12-01 NOTE — PROGRESS NOTES
Chief Complaint    Knee Pain (Left knee pain)      History of Present Illness:  Kenji Ibrahim is a 57 y.o. female who presents for follow up of left knee(s). The patient is here for 3 weeks follow up from their left TKA on 2022. The patient is doing very well over all. They are managing their pain with medication, ice and rest. They report their range of motion to be -3 to 104 with some difficulty. They are working closely with physical therapy on the post operative rehabilitation protocol as well as a home exercise program. They report no numbness, paresthesia or weakness at this time and they have no calf tenderness or new injuries to report. Patient overall feels she is doing well. She is able to sleep half way thru the night now and she is pleased with that. Pain Assessment:  Location: left  Level: 2 out of 10  Duration: Weeks  Description: achy  Result of an injury: Yes  Work related injury: No  Aggravating actions: walking  Relieving Factors: rest  Wants injections: No     Past Medical History:   Diagnosis Date    COPD (chronic obstructive pulmonary disease) ([facility])     Wears glasses         Past Surgical History:   Procedure Laterality Date     SECTION      X2    DILATION AND CURETTAGE OF UTERUS      X2    KNEE ARTHROSCOPY Left     TOTAL KNEE ARTHROPLASTY Left 2022    LEFT TOTAL KNEE REPLACEMENT performed by Nuno Simental MD at [facility]         No family history on file.     Social History     Socioeconomic History    Marital status:      Spouse name: None    Number of children: None    Years of education: None    Highest education level: None   Tobacco Use    Smoking status: Never    Smokeless tobacco: Never   Vaping Use    Vaping Use: Never used   Substance and Sexual Activity    Alcohol use: Not Currently    Drug use: Not Currently    Sexual activity: Yes       Current Outpatient Medications   Medication Sig Dispense Refill    docusate sodium (COLACE) 100 MG capsule Take 1 capsule by mouth 2 times daily 60 capsule 0    aspirin EC 81 MG EC tablet Take 1 tablet by mouth in the morning and at bedtime 30 tablet 0    SYMBICORT 160-4.5 MCG/ACT AERO INHALE 2 PUFFS BY MOUTH TWICE DAILY      omeprazole (PRILOSEC) 40 MG delayed release capsule TAKE 1 CAPSULE BY MOUTH EVERY DAY      montelukast (SINGULAIR) 10 MG tablet TAKE 1 TABLET BY MOUTH EVERYDAY AT BEDTIME      celecoxib (CELEBREX) 100 MG capsule TAKE 1 CAPSULE BY MOUTH EVERY DAY WITH FOOD AT NIGHT      SUMAtriptan (IMITREX) 100 MG tablet TAKE 1 TABLET BY MOUTH TWICE DAILY AS NEEDED, AT LEAST 2 HOURS BETWEEN DOSES      ALBUTEROL IN Inhale into the lungs as needed      albuterol (PROVENTIL) (2.5 MG/3ML) 0.083% nebulizer solution Take 2.5 mg by nebulization every 6 hours as needed for Wheezing       No current facility-administered medications for this visit. No Known Allergies    Review of Systems:  A 14 point review of systems was completed by the patient and is available in the media section of the scanned medical record and was reviewed on 12/1/2022. The review is negative with the exception of those things mentioned in the HPI and Past Medical History     Vital Signs:   Ht 5' 9\" (1.753 m)   Wt 209 lb (94.8 kg)   LMP  (LMP Unknown)   BMI 30.86 kg/m²     General Exam:   Mental Status: The patient is oriented to time, place and person. The patient's mood and affect are appropriate. Neurological: The patient has good coordination. There is no weakness or sensory deficit. Knee Examination: Left    Skin:  There are no skin lesions, cellulitis, or extreme edema in the lower extremities. Sensation is grossly intact to light touch bilaterally lower extremity. The patient has warm and well-perfused Bilateral lower extremities with brisk capillary refill. Inspection:  The is mild edema, no gross deformities, and no signs of infection. Surgical incision is healing well with no signs of infection. Palpation: There is no patellofemoral crepitus, there is no significant tenderness over the knee consistent with their post operative status. Range of Motion:  -3 to 104  and grossly intact with pain and/or difficulty    Strength: Motor is grossly intact    Special Tests: There is no ligament instability. Grinding/Crepitus (-)    Gait: antalgic  with the use of assistive devices    Alignment: neutral    Radiology:     Lorri Rebollar x-rays (2 views) of her left knee taken 12/1/2022 showed satisfactory healing with no evidence of acute fracture or loosening. Office Procedures:  Orders Placed This Encounter   Procedures    XR KNEE LEFT (1-2 VIEWS)     Standing Status:   Future     Number of Occurrences:   1     Standing Expiration Date:   12/1/2023     Order Specific Question:   Reason for exam:     Answer:   left knee pain            Assessment: Queen Ernestine is a 57 y.o. female who presents for follow up of left knee(s). 3 weeks Post Op L TKA     Encounter Diagnoses   Name Primary? Acute pain of left knee Yes    Status post total left knee replacement        Patient's workup and evaluation were reviewed with the patient in the office today. Imaging was also reviewed with the patient in the clinic today. Given the patient's history and physical exam the patient is healing appropriately on the post operative course. They should continue to work closely with physical therapy to progress their range of motion and strength gains. We are very pleased with the patient's progress thus far. We are recommending that she continue with her ROM HEP 4-6 x a day. She will continue with physical therapy. All questions and concerns were addressed and answered. Treatment Plan:     Activity modification and rest  Ice 20 minutes every 1-2 hours PRN  NSAIDs PRN  Elevation at least 2 inches above the heart with pillows supporting the joint underneath for swelling  Home exercises to maintain strength and range of motion  Physical therapy including Strengthening  Appropriate diet/weight management    Diagnoses and treatments were reviewed with the patient today. Patient education material was provided. Questions were entertained and answered to the patient's satisfaction. Follow up: Physical Therapy      Blossom SEPULVEDA ATC am scribing for and in the presence of Dr. Cassi Yang. The physical examination was performed by Dr. Cassi Yang. All counseling during the appointment was performed between the patient and Dr. Cassi Yang. 12/01/22 12:28 PM   Hortencia Koroma MS, ATC     This dictation was performed with a verbal recognition program (DRAGON) and it was checked for errors. It is possible that there are still dictated errors within this office note. If so, please bring any errors to my attention for an addendum. All efforts were made to ensure that this office note is accurate. Supervising Physician Attestation:  I, Dr. Cassi Yang, personally performed the services described in this documentation as above, and it is both accurate and complete and I agree with all pertinent clinical information. I personally interviewed the patient and performed a physical examination and medical decision making. I discussed the patient's condition and treatment options and have  reviewed and agree with the past medical, family and social history unless otherwise noted. All of the patient's questions were answered. I personally supervised the Orthopedic and Sportsmedicine Fellow when when noted in the evaluation and treatment plan of the patient.       Board Certified Orthopaedic Surgeon  [address] [facility] and [address]  [facility] and [address] [facility] [employer]  Professor of Mike Horvath

## 2022-12-06 ENCOUNTER — HOSPITAL ENCOUNTER (OUTPATIENT)
Dept: PHYSICAL THERAPY | Age: 62
Setting detail: THERAPIES SERIES
Discharge: HOME | End: 2022-12-06
Payer: COMMERCIAL

## 2022-12-06 ENCOUNTER — APPOINTMENT (OUTPATIENT)
Dept: PHYSICAL THERAPY | Age: 62
End: 2022-12-06
Payer: COMMERCIAL

## 2022-12-06 NOTE — FLOWSHEET NOTE
The [facility] and [address],  Sports Performance and Rehabilitation, [facility] [address]  793 [facility],5Th Floor   Carla Leon  Phone: [phone]  Fax: [phone]      Physical Therapy  Cancellation/No-show Note  Patient Name:  Rakan Gaston  :  1960   Date:  2022  Cancelled visits to date: 1  No-shows to date: 0    For today's appointment patient:  [x]  Cancelled  []  Rescheduled appointment  []  No-show     Reason given by patient:  []  Patient ill  []  Conflicting appointment   []  No transportation    []  Conflict with work  []  No reason given  [x]  Other:     Comments:  Able to get into PT closer to home via waitlist    Electronically signed by:  Corinne Child, PT

## 2022-12-13 ENCOUNTER — TELEPHONE (OUTPATIENT)
Dept: ORTHOPEDIC SURGERY | Age: 62
End: 2022-12-13

## 2022-12-13 DIAGNOSIS — M25.562 ACUTE PAIN OF LEFT KNEE: Primary | ICD-10-CM

## 2022-12-13 DIAGNOSIS — Z96.652 STATUS POST TOTAL LEFT KNEE REPLACEMENT: ICD-10-CM

## 2022-12-13 NOTE — TELEPHONE ENCOUNTER
Request for PT referral sent to [facility]

## 2022-12-20 ENCOUNTER — HOSPITAL ENCOUNTER (OUTPATIENT)
Dept: PHYSICAL THERAPY | Age: 62
Setting detail: THERAPIES SERIES
Discharge: HOME | End: 2022-12-20
Payer: COMMERCIAL

## 2022-12-20 PROCEDURE — 97110 THERAPEUTIC EXERCISES: CPT | Performed by: PHYSICAL THERAPIST

## 2022-12-20 PROCEDURE — 97140 MANUAL THERAPY 1/> REGIONS: CPT | Performed by: PHYSICAL THERAPIST

## 2022-12-20 PROCEDURE — 97530 THERAPEUTIC ACTIVITIES: CPT | Performed by: PHYSICAL THERAPIST

## 2022-12-20 NOTE — PLAN OF CARE
The [address] and [address],  Sports Performance and Rehabilitation, [facility] [address]  793 Located within [facility],5Th Floor   Carla Leon  Phone: [phone]  Fax: [phone]   Physical Therapy Re-Certification Plan of Care    Dear  Dr. Ollie Hwang,    We had the pleasure of treating the following patient for physical therapy services at [address] [place] [address]. A summary of our findings can be found in the updated assessment below. This includes our plan of care. If you have any questions or concerns regarding these findings, please do not hesitate to contact me at [phone]. Thank you for the referral.     Physician Signature:________________________________Date:__________________  By signing above (or electronic signature), therapists plan is approved by physician      Overall Response to Treatment:   []Patient is responding well to treatment and improvement is noted with regards  to goals   [x]Patient should continue to improve in reasonable time if they continue HEP   []Patient has plateaued and is no longer responding to skilled PT intervention    []Patient is getting worse and would benefit from return to referring MD   []Patient unable to adhere to initial POC   [x]Other: Pt progressing very well. Good progress with ROM. Improved gait without AD but not yet normalized, showing some stiffness with transition from flexion to ext but minimal antalgia. Pt's strength progressing well. Did well with step up once she was able to improve confidence and reduce apprehension. Pt has been working consistently with PT and on HEP. Pt would continue to benefit from skilled PT intervention at facility closer to home and return to this facility for MD/PT follow-up around 12 weeks PO. Pt agreeable to this plan.      Date range of Visits: 11/10/22 - 12/20/22  Total Visits: 8      Physical Therapy Daily Treatment Note  Date:  12/20/2022    Patient Name:  Pan Stacy :  1960  MRN: [id number]  Restrictions/Precautions:    Medical/Treatment Diagnosis Information:  Diagnosis: M17.12 (ICD-10-CM) - Primary osteoarthritis of left knee  Treatment Diagnosis: M25.562 L Knee Pain, M25.662 L Knee Stiffness, R53.1 Weakness, R26.2 Difficulty with walking  S/P L TKR  DOS: 22  Meds:  Tylenol,   Insurance/Certification information:  PT Insurance Information: [employer] - out of network; paying OOP  Physician Information:   Isabelle Dodd MD  Has the plan of care been signed (Y/N):        [x]  Yes  []  No     Date of Patient follow up with Physician: Patient seen in consultation with Dr. Roman Cummings who established the initial/subsequent treatment protocol. 11-10-22: seen by PA  22: Doing well, can switch to Tylenol instead of Percocet if ready  22: Doing well, continue working with PT; can d/c TEDs at this time; cont to adjust overall activity level to control symptoms      Is this a Progress Report:     [x]  Yes  []  No        If Yes:  Date Range for reporting period:  Beginnin22  Endin22    Progress report will be due (10 Rx or 30 days whichever is less):       Recertification will be due (POC Duration  / 90 days whichever is less): 23        Visit # Insurance Allowable Auth Required   8 Out of network []  Yes []  No        OUTCOME MEASURE DATE SCORE   FOTO 22 33   FOTO 22 57          Latex Allergy:  [x]NO      []YES  Preferred Language for Healthcare:   [x]English       []other:    Pain level:  0-4/10     SUBJECTIVE: 6 week po. Progressing steadily. Gets stiff and sore with extra activity on her feet. Trying to focus on improved walking mechanics.      OBJECTIVE:   10/13/22  Flexibility L R Comment   Hamstring -20 -10 90/90   Gastroc To Neutral To Neutral     ITB WNL WNL     Quad Mod tightness WNL                  22          ROM PROM AROM Overpressure Comment     L R L R L R     Flexion 119 ERMI  107 cold 138 Extension -3 cold  0 best 0                                                    10/13/22  Strength L R Comment   Quad 4 5     Hamstring 4 5     Gastroc NT NT     Hip  flexion 4- 4     Hip abd 4- 4                               TEST INITIAL pre-op 9/20   GOAL   SINGLE LEG STANCE TIME L: 2 SEC     R: 16 SEC   >25 SECONDS   STAIR CLIMB TEST 16 SEC       TIMED UP And GO 10 SEC   61-75 y/o: <9sec  66-78 y/o: <10sec  80-79 y/o: <12 sec         Reflexes/Sensation:               [x]Dermatomes/Myotomes intact               []Reflexes equal and normal bilaterally              []Other:     Joint mobility:                [x]Normal                      []Hypo              []Hyper     Palpation: mild TTP around knee and thigh     Functional Mobility/Transfers: mod ind     Posture: flexed knee     Bandages/Dressings/Incisions: healing well     Gait: (include devices/WB status) mildly stiff-legged, no AD       RESTRICTIONS/PRECAUTIONS: L TKR    Exercises/Interventions:   Exercise/Equipment Resistance/Repetitions Other comments   Stretching     Hamstring 5 x 30\" Towel prop   Towel Pull 5 x 30\" Towel prop   Inclined Calf     Hip Flexion     ITB     Groin     Quad                    SLR     Supine 3 x 10 1#   Abduction 3 x 10    Adduction     Prone     SLR+          Isometrics     Quad sets 10 x 10\"    Ball squeeze 10 x 10\"         Post Chain     Bridges 3 x 10 Ball squeeze   H/L clams 3 x 10 Red loop        Patellar Glides 5'    Medial     Superior     Inferior          ROM     Sheet Pulls     Hang Weights Passive EOB well-leg mob   Active Weight Shift     Ankle Pumps    Rec bike ERMI 5 x 30\"              CKC     Calf raises 3x10 Biodex %WB   Wall sits     Step ups 2 x 10 6\" up/back   1 leg stand     Squatting     CC TKE    Balance 2' PS L8   bridges          PRE     Extension 3 x 5 x 3\" SAQ, 0#   Flexion 3 x 10 Standing, 0#        Quantum machines     Leg press      Leg extension     Leg curl          Manual interventions     PROM flex    Patellar mobility & STM 8'      HEP instruction & Full Post-op instructions provided:   Access Code: [email or web address]: [email or web address]  Date: 11/22/2022 (reviewed 12/1/22)  Prepared by: Usha Garcia    Exercises  Sitting Heel Slide with Towel - 3-6 x daily - 7 x weekly - 10 reps - 30 sec hold  Supine Knee Extension Stretch on Towel Roll - 3-6 x daily - 7 x weekly - 1 reps - 10 min hold  Seated Table Hamstring Stretch - 3 x daily - 7 x weekly - 1 sets - 5 reps - 30 hold  Long Sitting Calf Stretch with Strap - 3 x daily - 7 x weekly - 1 sets - 5 reps - 30 hold  Long Sitting Isometric Hip Adduction and Extension with Ball at Knees - 3 x daily - 7 x weekly - 1 sets - 10 reps - 10 hold  Supine Straight Leg Raises - 3 x daily - 7 x weekly - 3 sets - 5-10 reps  Supine Knee Extension Strengthening - 1 x daily - 7 x weekly - 3 sets - 10 reps  Sidelying Hip Abduction (Mirrored) - 1 x daily - 7 x weekly - 3 sets - 10 reps  Heel Raises with Counter Support - 1 x daily - 7 x weekly - 3 sets - 10 reps  Standing Knee Flexion (Mirrored) - 1 x daily - 7 x weekly - 3 sets - 10 reps       Therapeutic Exercise and NMR EXR  [x] (15630) Provided verbal/tactile cueing for activities related to strengthening, flexibility, endurance, ROM for improvements in LE, proximal hip, and core control with self care, mobility, lifting, ambulation.  [] (25403) Provided verbal/tactile cueing for activities related to improving balance, coordination, kinesthetic sense, posture, motor skill, proprioception  to assist with LE, proximal hip, and core control in self care, mobility, lifting, ambulation and eccentric single leg control.      NMR and Therapeutic Activities:    [x] (92837 or 72914) Provided verbal/tactile cueing for activities related to improving balance, coordination, kinesthetic sense, posture, motor skill, proprioception and motor activation to allow for proper function of core, proximal hip and LE with self care and ADLs  [x] (08908) Gait Re-education- Provided training and instruction to the patient for proper LE, core and proximal hip recruitment and positioning and eccentric body weight control with ambulation re-education including up and down stairs     Home Exercise Program:    [x] (37615) Reviewed/Progressed HEP activities related to strengthening, flexibility, endurance, ROM of core, proximal hip and LE for functional self-care, mobility, lifting and ambulation/stair navigation   [] (43861)Reviewed/Progressed HEP activities related to improving balance, coordination, kinesthetic sense, posture, motor skill, proprioception of core, proximal hip and LE for self care, mobility, lifting, and ambulation/stair navigation      Manual Treatments:  PROM / STM / Oscillations-Mobs:  G-I, II, III, IV (PA's, Inf., Post.)  [x] (53508) Provided manual therapy to mobilize LE, proximal hip and/or LS spine soft tissue/joints for the purpose of modulating pain, promoting relaxation,  increasing ROM, reducing/eliminating soft tissue swelling/inflammation/restriction, improving soft tissue extensibility and allowing for proper ROM for normal function with self care, mobility, lifting and ambulation. Modalities:  Pt declined    Charges:  Timed Code Treatment Minutes: 40'   Total Treatment Minutes: 40'   12:00 - 1:00    [] EVAL (LOW) 65929 (typically 20 minutes face-to-face)  [] EVAL (MOD) 53511 (typically 30 minutes face-to-face)  [] EVAL (HIGH) 52471 (typically 45 minutes face-to-face)  [] RE-EVAL   [x] TI(16163) x 1      [] IONTO  [] NMR (09233) x      [] VASO  [x] Manual (33310) x  1    [] Other:  [x] TA x   1    [] Mech Traction (46655)  [] ES(attended) (08970)      [] ES (un) (28204):     GOALS:   Patient stated goal: Return to PLOF without restrictions    [x] Progressing: [] Met: [] Not Met: [] Adjusted     Therapist goals for Patient:   Short Term Goals: To be achieved in: 2 weeks  1.  Independent in HEP and progression per patient tolerance, in order to prevent re-injury. [] Progressing: [x] Met: [] Not Met: [] Adjusted   2. Patient will have a decrease in pain to facilitate improvement in movement, function, and ADLs as indicated by Functional Deficits. [] Progressing: [x] Met: [] Not Met: [] Adjusted     Long Term Goals: To be achieved in: 12 weeks PO  1. Disability index score of 50+ on FOTO to assist with reaching prior level of function. [] Progressing: [x] Met: [] Not Met: [] Adjusted  2. Patient will demonstrate increased AROM to 0-130 deg to allow for proper joint functioning as indicated by patients Functional Deficits. [x] Progressing: [] Met: [] Not Met: [] Adjusted  3. Patient will demonstrate an increase in Strength to good proximal hip strength and control, within 5lb HHD in LE to allow for proper functional mobility as indicated by patients Functional Deficits. [x] Progressing: [] Met: [] Not Met: [] Adjusted  4. Patient will return to household functional activities without increased symptoms or restriction. [x] Progressing: [] Met: [] Not Met: [] Adjusted  5. Pt will be able to ascend a flight of stairs reciprocally without restrictions. (patient specific functional goal)    [x] Progressing: [] Met: [] Not Met: [] Adjusted          Overall Progression Towards Functional goals/ Treatment Progress Update:  [x] Patient is progressing as expected towards functional goals listed. [] Progression is slowed due to complexities/Impairments listed. [] Progression has been slowed due to co-morbidities.   [] Plan just implemented, too soon to assess goals progression <30days   [] Goals require adjustment due to lack of progress  [] Patient is not progressing as expected and requires additional follow up with physician  [] Other    Prognosis for POC: [x] Good [] Fair  [] Poor      Patient requires continued skilled intervention: [x] Yes  [] No    Treatment/Activity Tolerance:  [x] Patient able to complete treatment  [] Patient limited by fatigue  [] Patient limited by pain     [] Patient limited by other medical complications  [x] Other: see above    PLAN: See eval. Periodic appts for check-in in the next 8 weeks. (Updated 12/20/22)  [] Continue per plan of care [x] Alter current plan (see comments above)  [] Plan of care initiated [] Hold pending MD visit [] Discharge      Electronically signed by:  Emily Barnett, PT, DPT    Note: If patient does not return for scheduled/ recommended follow up visits, this note will serve as a discharge from care along with most recent update on progress.

## 2023-01-31 ENCOUNTER — TELEPHONE (OUTPATIENT)
Dept: ORTHOPEDIC SURGERY | Age: 63
End: 2023-01-31

## 2023-02-07 ENCOUNTER — TELEPHONE (OUTPATIENT)
Dept: ORTHOPEDIC SURGERY | Age: 63
End: 2023-02-07

## 2023-02-07 ENCOUNTER — HOSPITAL ENCOUNTER (OUTPATIENT)
Dept: PHYSICAL THERAPY | Age: 63
Setting detail: THERAPIES SERIES
Discharge: HOME | End: 2023-02-07
Payer: COMMERCIAL

## 2023-02-07 PROCEDURE — 97110 THERAPEUTIC EXERCISES: CPT | Performed by: PHYSICAL THERAPIST

## 2023-02-07 PROCEDURE — 97750 PHYSICAL PERFORMANCE TEST: CPT | Performed by: PHYSICAL THERAPIST

## 2023-02-07 NOTE — PROGRESS NOTES
The [place] and [address],  [facility], [facility] [address]  [address] [facility],[address]   Carla Leon  Phone: [phone]  Fax: [phone]       Physical Therapy Daily Treatment Note  Date:  2023    Patient Name:  Richard Mathews    :  1960  MRN: [id number]  Restrictions/Precautions:    Medical/Treatment Diagnosis Information:  Diagnosis: M17.12 (ICD-10-CM) - Primary osteoarthritis of left knee  Treatment Diagnosis: M25.562 L Knee Pain, M25.662 L Knee Stiffness, R53.1 Weakness, R26.2 Difficulty with walking  S/P L TKR  DOS:   Insurance/Certification information:  PT Insurance Information: [employer] - out of network; paying OOP  Physician Information:   Kyung Cline MD  Has the plan of care been signed (Y/N):        [x]  Yes  []  No     Date of Patient follow up with Physician: Patient seen in consultation with Dr. Warren Sinha who established the initial/subsequent treatment protocol.   11-10-22: seen by PA  22: Doing well, can switch to Tylenol instead of Percocet if ready  22: Doing well, continue working with PT; can d/c TEDs at this time; cont to adjust overall activity level to control symptoms  23: doing well; RTW 20 hours/wk, expect swelling and take steps to reduce - breaks to ice, anti-inflammatory, compression      Is this a Progress Report:     [x]  Yes  []  No        If Yes:  Date Range for reporting period:  Beginnin22  Endin23    Progress report will be due (10 Rx or 30 days whichever is less): 53      Recertification will be due (POC Duration  / 90 days whichever is less): 23        Visit # Insurance Allowable Auth Required   9 Out of network []  Yes []  No        OUTCOME MEASURE DATE SCORE   FOTO 22 33   FOTO 22 57   FOTO 23 63          Latex Allergy:  [x]NO      []YES  Preferred Language for Healthcare:   [x]English       []other:    Pain level: 0-2/10     SUBJECTIVE: 3 months po. Pt doing fairly well overall. Still having some trouble with descending stairs - combo of apprehension, stiffness and weakness. Does have increased swelling and stiffness with more time spent on her feet. OBJECTIVE:   2/7/23  Flexibility L R Comment   Hamstring -10 -10 90/90   Gastroc To Neutral To Neutral     ITB WNL WNL     Quad Mod tightness WNL                  2/7/23          ROM PROM AROM Overpressure Comment     L R L R L R     Flexion 125 cold 138             Extension 0 cold 0                                                   2/7/23  Strength L R Comment   Quad   See biodex   Hamstring   See biodex   Gastroc NT NT     Hip  flexion 4 4     Hip abd 4 4                               TEST INITIAL pre-op 9/20 2/7/23 follow-up GOAL   SINGLE LEG STANCE TIME L: 2 SEC     R: 16 SEC L: 25 SEC +    R: 18 SEC >25 SECONDS   STAIR CLIMB TEST 16 SEC  13.3 SEC  <13 SECONDS   TIMED UP And GO 10 SEC  8.1 SEC 61-77 y/o: <9sec  66-76 y/o: <10sec  80-79 y/o: <12 sec         Reflexes/Sensation:               [x]Dermatomes/Myotomes intact               []Reflexes equal and normal bilaterally              []Other:     Joint mobility:                [x]Normal                      []Hypo              []Hyper     Palpation: mild TTP around knee and thigh     Functional Mobility/Transfers: mod ind     Posture: WNL     Bandages/Dressings/Incisions: well healed     Gait: (include devices/WB status) WNL, no AD     Knee Isometric Strength Testing Results Summary  On 2/7/2023 the patient, Ibrahima Garcia, underwent an Isometric Strength Test to evaluate current status and progress of the strengthening phase of his/her current rehabilitation program.  He/She is currently being treated for TKR. Attached you will find a computer generated summary of the results which outlines the current status.       To summarize these results you will find that Ibrahima Garcia underwent a test measuring the strength of the knee Quadriceps and Hamstrings muscle groups at the isometric angle 60 degrees. Standard protocol of testing is to provide pre-test stretching and warm up of both extremities followed by instruction in the test procedure and \"practice\" repetitions prior to each actual test session. The uninjured extremity undergoes the test procedure first followed by the injured extremity. Test Results:    Bilateral Difference:  Quadricep  17% [x] Deficit   [] Surplus   Hamstring  10% [x] Deficit   [] Surplus     Normative Data:  Quadricep Normal: 50 Patient: 34   Hamstring Normal: 30 Patient: 18         RESTRICTIONS/PRECAUTIONS: L TKR    Exercises/Interventions:   Exercise/Equipment Resistance/Repetitions Other comments   Stretching     Hamstring 5 x 30\" Towel prop   Towel Pull 5 x 30\" Towel prop   Inclined Calf     Hip Flexion     ITB     Groin     Quad          Rec Bike 8'         SLR     Supine Abduction Adduction Prone SLR+  Isometrics Quad sets Ball squeeze  Post Chain Bridges H/L clams  Patellar Glides Medial Superior     Inferior          ROM     Sheet Pulls     Hang Weights Passive EOB well-leg mob   Active Weight Shift     Ankle Pumps    Rec bike ERMI              CKC     Calf raises Wall sits Step ups 1 leg stand Squatting CC TKE Balance bridges          PRE     Extension Flexion      Quantum machines     Leg press      Leg extension     Leg curl          Manual interventions     PROM flex    Patellar mobility & STM      HEP instruction & Full Post-op instructions provided:   Access Code: [email or web address]  URL: [email or web address]  Date: 02/07/2023  Prepared by: Louie Loud    Exercises  Recumbent Bike - 3 x weekly  Standing Bilateral Gastroc Stretch with Step - 1-2 x daily - 7 x weekly - 3-5 reps - 30 sec hold  Seated Table Hamstring Stretch - 1-2 x daily - 7 x weekly - 1 sets - 3-5 reps - 30 hold  Prone Quadriceps Stretch with Strap (Mirrored) - 1 x daily - 7 x weekly - 3-5 reps - 30 sec hold  Supine ITB Stretch with Strap (Mirrored) - 1-2 x daily - 7 x weekly - 3-5 reps - 30 sec hold  Supine Straight Leg Raises - 3 x daily - 3 x weekly - 3 sets - 5-10 reps  Sidelying Hip Abduction (Mirrored) - 1 x daily - 3 x weekly - 3 sets - 10 reps  Supine Bridge with Resistance Band - 1 x daily - 3 x weekly - 3 sets - 10 reps  Hooklying Clamshell with Resistance - 1 x daily - 3 x weekly - 3 sets - 10 reps  Side Stepping with Resistance at Thighs - 1 x daily - 3 x weekly - 3 sets - 10 reps  Heel Raises with Counter Support - 1 x daily - 3 x weekly - 3 sets - 10 reps  Forward Step Down with Counter Support at Side - 1 x daily - 3 x weekly - 3 sets - 10 reps  Sitting Knee Extension with Resistance - 1 x daily - 3 x weekly - 3 sets - 10 reps  Standing Hamstring Curl with Resistance - 1 x daily - 3 x weekly - 3 sets - 10 reps  Single Leg Balance - 1 x daily - 3 x weekly - 3 reps - 30 sec hold      Therapeutic Exercise and NMR EXR  [x] (07262) Provided verbal/tactile cueing for activities related to strengthening, flexibility, endurance, ROM for improvements in LE, proximal hip, and core control with self care, mobility, lifting, ambulation.  [] (71296) Provided verbal/tactile cueing for activities related to improving balance, coordination, kinesthetic sense, posture, motor skill, proprioception  to assist with LE, proximal hip, and core control in self care, mobility, lifting, ambulation and eccentric single leg control.      NMR and Therapeutic Activities:    [x] (24785 or 40442) Provided verbal/tactile cueing for activities related to improving balance, coordination, kinesthetic sense, posture, motor skill, proprioception and motor activation to allow for proper function of core, proximal hip and LE with self care and ADLs  [x] (78751) Gait Re-education- Provided training and instruction to the patient for proper LE, core and proximal hip recruitment and positioning and eccentric body weight control with ambulation re-education including up and down stairs     Home Exercise Program:    [x] (47675) Reviewed/Progressed HEP activities related to strengthening, flexibility, endurance, ROM of core, proximal hip and LE for functional self-care, mobility, lifting and ambulation/stair navigation   [] (01496)Reviewed/Progressed HEP activities related to improving balance, coordination, kinesthetic sense, posture, motor skill, proprioception of core, proximal hip and LE for self care, mobility, lifting, and ambulation/stair navigation      Manual Treatments:  PROM / STM / Oscillations-Mobs:  G-I, II, III, IV (PA's, Inf., Post.)  [x] (28277) Provided manual therapy to mobilize LE, proximal hip and/or LS spine soft tissue/joints for the purpose of modulating pain, promoting relaxation,  increasing ROM, reducing/eliminating soft tissue swelling/inflammation/restriction, improving soft tissue extensibility and allowing for proper ROM for normal function with self care, mobility, lifting and ambulation. Modalities: CP 15'    Charges:  Timed Code Treatment Minutes: 35'   Total Treatment Minutes: 35'   9:45 - 10:45    [] EVAL (LOW) 88467 (typically 20 minutes face-to-face)  [] EVAL (MOD) 41665 (typically 30 minutes face-to-face)  [] EVAL (HIGH) 68561 (typically 45 minutes face-to-face)  [] RE-EVAL   [x] GJ(12735) x 1      [] IONTO  [] NMR (94664) x      [] VASO  [] Manual (63760) x      [x] Other: PPT x 1  [] TA x       [] Mech Traction (44744)  [] ES(attended) (02250)      [] ES (un) (50381):     GOALS:   Patient stated goal: Return to PLOF without restrictions    [x] Progressing: [] Met: [] Not Met: [] Adjusted     Therapist goals for Patient:   Short Term Goals: To be achieved in: 2 weeks  1. Independent in HEP and progression per patient tolerance, in order to prevent re-injury. [] Progressing: [x] Met: [] Not Met: [] Adjusted   2.  Patient will have a decrease in pain to facilitate improvement in movement, function, and ADLs as indicated by Functional Deficits. [] Progressing: [x] Met: [] Not Met: [] Adjusted     Long Term Goals: To be achieved in: 12 weeks PO  1. Disability index score of 50+ on FOTO to assist with reaching prior level of function. [] Progressing: [x] Met: [] Not Met: [] Adjusted  2. Patient will demonstrate increased AROM to 0-130 deg to allow for proper joint functioning as indicated by patients Functional Deficits. [x] Progressing: [] Met: [] Not Met: [] Adjusted  3. Patient will demonstrate an increase in Strength to good proximal hip strength and control, within 5lb HHD in LE to allow for proper functional mobility as indicated by patients Functional Deficits. [] Progressing: [x] Met: [] Not Met: [] Adjusted  4. Patient will return to household functional activities without increased symptoms or restriction. [] Progressing: [x] Met: [] Not Met: [] Adjusted  5. Pt will be able to ascend a flight of stairs reciprocally without restrictions. (patient specific functional goal)    [] Progressing: [x] Met: [] Not Met: [] Adjusted          Overall Progression Towards Functional goals/ Treatment Progress Update:  [x] Patient is progressing as expected towards functional goals listed. [] Progression is slowed due to complexities/Impairments listed. [] Progression has been slowed due to co-morbidities. [] Plan just implemented, too soon to assess goals progression <30days   [] Goals require adjustment due to lack of progress  [] Patient is not progressing as expected and requires additional follow up with physician  [] Other    Prognosis for POC: [x] Good [] Fair  [] Poor      Patient requires continued skilled intervention: [x] Yes  [] No    Treatment/Activity Tolerance:  [x] Patient able to complete treatment  [] Patient limited by fatigue  [] Patient limited by pain     [] Patient limited by other medical complications  [x] Other: Pt doing quite well for 3 months PO.  Has med goals for TUG and SLB with slightly increased time on SCT. Pt has 17% deficit of quad compared to uninvolved side but 32% deficit when compared to norms. PT has 10% deficit of HS when compared to uninvolved side but 40% deficit when compared to norms. Pt has reported restriction with descending stairs, likely d/t apprehension and weakness of quad. Pt continues to have deficits in ROM and strength that are impacting some of the more advanced functional activities. Pt would benefit from continued strength and function interventions and periodic check ins to ensure pt is progressing appropriately with IHEP that was provided today. Pt to contact PT sooner than 6 months PO if needed. PLAN: See eval. 6 mos PO - repeat strength and function testing. [] Continue per plan of care [x] Alter current plan (see comments above)  [] Plan of care initiated [] Hold pending MD visit [] Discharge      Electronically signed by:  Jenni Martino PT, DPT    Note: If patient does not return for scheduled/ recommended follow up visits, this note will serve as a discharge from care along with most recent update on progress.

## 2023-02-07 NOTE — TELEPHONE ENCOUNTER
Working on updating the aps for [facility] and a return to work form. Waiting for a reply from clinic regarding length of time for restrictions.

## 2023-02-08 ENCOUNTER — TELEPHONE (OUTPATIENT)
Dept: ORTHOPEDIC SURGERY | Age: 63
End: 2023-02-08

## 2023-02-08 NOTE — TELEPHONE ENCOUNTER
Will fax the return to work form to [employer] [phone] once I get confirmation on length of time for restrictions.

## 2023-02-08 NOTE — TELEPHONE ENCOUNTER
Faxed updated aps to [phone] Krystal Angeles)     completed return to work form for [employer]. Will contact the patient regarding picking up that form or getting a fax #.

## (undated) DEVICE — SUTURE MCRYL SZ 4-0 L27IN ABSRB UD L19MM PS-2 1/2 CIR PRIM Y426H

## (undated) DEVICE — APPLICATOR MEDICATED 26 CC SOLUTION HI LT ORNG CHLORAPREP

## (undated) DEVICE — SYRINGE CATH TIP 50ML

## (undated) DEVICE — SYRINGE 60ML IRRIG PISTON TOMEY

## (undated) DEVICE — SUTURE MCRYL SZ 3-0 L27IN ABSRB UD L26MM SH 1/2 CIR Y416H

## (undated) DEVICE — SOLUTION IV 1000ML LAC RINGERS PH 6.5 INJ USP VIAFLX PLAS

## (undated) DEVICE — COUNTER NDL 40 COUNT HLD 70 NUM FOAM BLK SGL MAG W BLDE REMV

## (undated) DEVICE — SYRINGE MED 10ML LUERLOCK TIP W/O SFTY DISP

## (undated) DEVICE — SOLUTION IV 100ML 0.9% SOD CHL PH5 CONTAIN DEHP VIAFLX QP

## (undated) DEVICE — 3 BONE CEMENT MIXER: Brand: MIXEVAC

## (undated) DEVICE — DUAL CUT SAGITTAL BLADE

## (undated) DEVICE — SUTURE VCRL SZ 0 L18IN ABSRB UD L36MM CT-1 1/2 CIR J840D

## (undated) DEVICE — 3M™ COBAN™ NL STERILE NON-LATEX SELF-ADHERENT WRAP, 2086S, 6 IN X 5 YD (15 CM X 4,5 M), 12 ROLLS/CASE: Brand: 3M™ COBAN™

## (undated) DEVICE — Device

## (undated) DEVICE — UNDERGLOVE SURG SZ 9 FNGR THK0.21MIL GRN LTX BEAD CUF

## (undated) DEVICE — NEEDLE, QUINCKE, 18GX3.5": Brand: MEDLINE

## (undated) DEVICE — SUTURE ETHBND EXCEL SZ 0 L18IN NONABSORBABLE GRN L36MM CT-1 CX21D

## (undated) DEVICE — TOWEL,STOP FLAG GOLD N-W: Brand: MEDLINE

## (undated) DEVICE — PENCIL SMK EVAC TELSCP 3 M TBNG

## (undated) DEVICE — SOLUTION IV IRRIG WATER 1000ML POUR BRL 2F7114

## (undated) DEVICE — GLOVE SURG SZ 7 L12IN FNGR THK79MIL GRN LTX FREE

## (undated) DEVICE — ELECTRODE PT RET AD L9FT HI MOIST COND ADH HYDRGEL CORDED

## (undated) DEVICE — SUTURE MCRYL SZ 2-0 L18IN ABSRB VLT L36MM CT-1 1/2 CIR Y739D

## (undated) DEVICE — STRIP,CLOSURE,WOUND,MEDI-STRIP,1/2X4: Brand: MEDLINE

## (undated) DEVICE — SPONGE,LAP,18"X18",DLX,XR,ST,5/PK,40/PK: Brand: MEDLINE

## (undated) DEVICE — SST BUR, WIRE PASS DRILL, 2 FLUTES, MED., 2MM DIA.: Brand: MICROAIRE®

## (undated) DEVICE — GLOVE SURG SZ 7 CRM LTX FREE POLYISOPRENE POLYMER BEAD ANTI

## (undated) DEVICE — BASIC SINGLE BASIN 1-LF: Brand: MEDLINE INDUSTRIES, INC.

## (undated) DEVICE — SOLUTION IV 1000ML 0.9% SOD CHL

## (undated) DEVICE — NEEDLE HYPO 18GA L1.5IN PNK POLYPR HUB S STL REG BVL STR

## (undated) DEVICE — PAD,ABDOMINAL,5"X9",ST,LF,25/BX: Brand: MEDLINE INDUSTRIES, INC.

## (undated) DEVICE — FLUID TRAP FOR MINIVAC ES EQUIP FLD TRAP

## (undated) DEVICE — TOWEL,OR,DSP,ST,BLUE,DLX,8/PK,10PK/CS: Brand: MEDLINE

## (undated) DEVICE — INTENDED FOR TISSUE SEPARATION, AND OTHER PROCEDURES THAT REQUIRE A SHARP SURGICAL BLADE TO PUNCTURE OR CUT.: Brand: BARD-PARKER ® CARBON RIB-BACK BLADES

## (undated) DEVICE — SET IV PMP 1 PRT CK VLV SPL SEPT PRTS 2 PC M LL 20 GTT LEN

## (undated) DEVICE — DRESSING FOAM 4X8IN DISP POSTOP MEPILEX BORD AG

## (undated) DEVICE — 450 ML BOTTLE OF 0.05% CHLORHEXIDINE GLUCONATE IN 99.95% STERILE WATER FOR IRRIGATION, USP AND APPLICATOR.: Brand: IRRISEPT ANTIMICROBIAL WOUND LAVAGE

## (undated) DEVICE — DRESSING FOAM W4XL12IN SIL RECT ADH WTRPRF FLM BK W/ BORD

## (undated) DEVICE — TOTAL KNEE: Brand: MEDLINE INDUSTRIES, INC.